# Patient Record
Sex: MALE | Race: WHITE | NOT HISPANIC OR LATINO | Employment: OTHER | URBAN - METROPOLITAN AREA
[De-identification: names, ages, dates, MRNs, and addresses within clinical notes are randomized per-mention and may not be internally consistent; named-entity substitution may affect disease eponyms.]

---

## 2017-01-17 ENCOUNTER — RECORDS - HEALTHEAST (OUTPATIENT)
Dept: LAB | Facility: CLINIC | Age: 67
End: 2017-01-17

## 2017-01-17 LAB — PSA SERPL-MCNC: 1 NG/ML (ref 0–4.5)

## 2017-06-16 ENCOUNTER — AMBULATORY - HEALTHEAST (OUTPATIENT)
Dept: NEUROLOGY | Facility: CLINIC | Age: 67
End: 2017-06-16

## 2017-06-16 DIAGNOSIS — R41.3 MEMORY IMPAIRMENT: ICD-10-CM

## 2017-07-10 ENCOUNTER — OFFICE VISIT (OUTPATIENT)
Dept: NEUROPSYCHOLOGY | Facility: CLINIC | Age: 67
End: 2017-07-10

## 2017-07-10 DIAGNOSIS — R41.842 VISUOSPATIAL DEFICIT: Primary | ICD-10-CM

## 2017-07-10 DIAGNOSIS — F33.2 SEVERE RECURRENT MAJOR DEPRESSION WITHOUT PSYCHOTIC FEATURES (H): ICD-10-CM

## 2017-07-10 DIAGNOSIS — F41.9 ANXIETY: ICD-10-CM

## 2017-07-10 NOTE — PROGRESS NOTES
The patient was seen for neuropsychological evaluation at the request of Willian Marcum for the purpose of diagnostic clarification and treatment planning.  Two hours of face to face testing were provided by this writer.  Please see Dr. Jonathan Langford's report for a full interpretation of the findings.    Nanda Espinoza  Psychometrist

## 2017-07-10 NOTE — MR AVS SNAPSHOT
After Visit Summary   7/10/2017    Leena Glover    MRN: [id number]           Patient Information     Date Of Birth          1950        Visit Information        Provider Department      7/10/2017 8:00 AM Jonathan Langford, PhD [facility] Neuro[facility]ology        Today's Diagnoses     Visuospatial deficit    -  1    Severe recurrent major depression without psychotic features (H)        Anxiety           Follow-ups after your visit        Who to contact     Please call your clinic at [phone] to:    Ask questions about your health    Make or cancel appointments    Discuss your medicines    Learn about your test results    Speak to your doctor   If you have compliments or concerns about an experience at your clinic, or if you wish to file a complaint, please contact [facility] Patient Relations at [phone] or email us at [email or web address]         Additional Information About Your Visit        MyChart Information     [employer] gives you secure access to your electronic health record. If you see a primary care provider, you can also send messages to your care team and make appointments. If you have questions, please call your primary care clinic.  If you do not have a primary care provider, please call [phone] and they will assist you.      [employer] is an electronic gateway that provides easy, online access to your medical records. With [employer], you can request a clinic appointment, read your test results, renew a prescription or communicate with your care team.     To access your existing account, please contact your [facility] or call [phone] for assistance.        Care EveryWhere ID     This is your Care EveryWhere ID. This could be used by other organizations to access your [place] medical records  [id number]         Blood Pressure from Last 3 Encounters:   No data found for BP    Weight from Last 3  Encounters:   No data found for Wt              We Performed the Following     [id number] TESTING, PER HR/PSYCHOLOGIST     NEUROPSYCH TESTING BY [facility]        Primary Care Provider    No Ref-Primary Verified       No address on file        Equal Access to Services     ROSARIO ROSEN : Hadii aad ku hadnilajah Garcia, gabino christianoraizaha, froy aguilar, aydee erikain hayaaangel sawyergus gold maryan braden. So [facility] [phone].    ATENCIÓN: Si habla español, tiene a vázquez disposición servicios gratuitos de asistencia lingüística. Llame al [phone].    We comply with applicable federal civil rights laws and [place] laws. We do not discriminate on the basis of race, color, national origin, age, disability sex, sexual orientation or gender identity.            Thank you!     Thank you for choosing [facility] NEUROPSYCHOLOGY  for your care. Our goal is always to provide you with excellent care. Hearing back from our patients is one way we can continue to improve our services. Please take a few minutes to complete the written survey that you may receive in the mail after your visit with us. Thank you!             Your Updated Medication List - Protect others around you: Learn how to safely use, store and throw away your medicines at www.disposemymeds.org.      Notice  As of 7/10/2017 11:59 PM    You have not been prescribed any medications.

## 2017-07-12 NOTE — PROGRESS NOTES
__ Orientation            Time          __ -1 ____        Place        ____2____        Personal Info.     ____4____    __WAIS-IV   FSIQ____VCI_____PRI_____ WMI__97__PSI___     Raw  Age SS  __Similarities  __22__               ___9___  __Vocabulary  _______ _______  __Information  _______          _______  __Comprehension _______ _______  __Block Design  __20___              __6____  __Matrix Reas.  ________ _______  __Visual Puzzles _______ _______  __Picture Comp. _______ _______  __Figure Weights _______ _______  __Digit Span  __21___ ___8___  __Arithmetic  __15___              __11___  __L-N Sequencing _______ _______  __Symbol Search _______ _______  __Coding  __58___ __11___  __Cancellation  _______ _______    __AVLT  Trial   1         2         3          4        5        B         6            _5_    _6_     _5_     _7_    _6_   _3_     _6_      Raw      %ile  Learning   _29___        -  Short Delay   __6___    20-28  30 min. delayed recall  __5___    28-36  Recognition hits   __11__    16-28  Recognition false positives __9___        -    __Petty/Sue Complex Figure Test    Raw  T-score   %ile  Copy   _22.5__         -                 ?1  Imm. Recall _6.5__  ___32___ __4__  30  Delay _10__  ___39___ __14__  Recognition _22__       ___60___ __84__  Time               _260 __                       __BVRT  (form C)  Copy E-10 rating ____/4     Raw    Zscore  Correct  ___3____ ___-2.34___  Incorrect ___12____ ___-3.12___    __WRAT-4   Reading SS = 92     %ile = 30 GE = 10.8    __COWAT   Raw__41___ Tscore__48___   __Semantic Fluency/Animals   Raw = 22     T-score = 54  __BNT   Raw = 58 %ile = 100  __Complex Ideational Material   Raw = 11/12 T-Score = 41    __Trail Making Test   A =   42         Errors = 0    %ile = 45-54   B =  76        Errors = 0    %ile = 82  __Stroop                       Raw         %ile        Word = _69_       27_        Color =  _36_       <9_        C/W   =  _30_        82_    __JoLO   Raw = 16 %ile = 21    __Grooved Pegboard   RH = 144 T = 26 Drops = 1   LH = 188 T = 23 Drops = 3    __BDI-II  Raw__37__ Interp. __severe___  __BAI  Raw__26__ Interp.__severe___  __MMPI-2RF    __WMS-III   LM1 = 37 SS = 11   LM2 = 20 SS = 11   LM2R = 22 Zscore = -0.80    __TOMM   T1 = 45  T2 = 50
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
## 2017-07-13 ENCOUNTER — OFFICE VISIT (OUTPATIENT)
Dept: NEUROPSYCHOLOGY | Facility: CLINIC | Age: 67
End: 2017-07-13

## 2017-07-13 DIAGNOSIS — R41.842 VISUOSPATIAL DEFICIT: Primary | ICD-10-CM

## 2017-07-13 DIAGNOSIS — F33.2 SEVERE RECURRENT MAJOR DEPRESSION WITHOUT PSYCHOTIC FEATURES (H): ICD-10-CM

## 2017-07-13 DIAGNOSIS — F41.9 ANXIETY: ICD-10-CM

## 2017-07-13 NOTE — MR AVS SNAPSHOT
After Visit Summary   7/13/2017    Leena Glover    MRN: [id number]           Patient Information     Date Of Birth          1950        Visit Information        Provider Department      7/13/2017 9:00 AM Jonathan Langford, PhD 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)        Anxiety           Follow-ups after your visit        Who to contact     Please call your clinic at [phone] to:    Ask questions about your health    Make or cancel appointments    Discuss your medicines    Learn about your test results    Speak to your doctor   If you have compliments or concerns about an experience at your clinic, or if you wish to file a complaint, please contact [facility] Patient Relations at [phone] or email us at [email or web address]         Additional Information About Your Visit        MyChart Information     [employer] interactive [employer] gives you secure access to your electronic health record. If you see a primary care provider, you can also send messages to your care team and make appointments. If you have questions, please call your primary care clinic.  If you do not have a primary care provider, please call [phone] and they will assist you.      [employer] is an electronic gateway that provides easy, online access to your medical records. With [employer], you can request a clinic appointment, read your test results, renew a prescription or communicate with your care team.     To access your existing account, please contact your [facility] or call [phone] for assistance.        Care EveryWhere ID     This is your Care EveryWhere ID. This could be used by other organizations to access your [place] medical records  [id number]         Blood Pressure from Last 3 Encounters:   No data found for BP    Weight from Last 3  Encounters:   No data found for Wt              We Performed the Following     [id number] TESTING, PER HR/PSYCHOLOGIST        Primary Care Provider Office Phone # Fax #    Willian Marcum [phone]       [facility] [address] [address]        Equal Access to Services     ROSARIO ROSEN : Hadii aad ku hadasho Soomaali, waaxda luqadaha, qaybta kaalmada adeegyada, waxay erikain hayluis manueln chelsea steinberg . So [facility] [phone].    ATENCIÓN: Si habla español, tiene a vázquez disposición servicios gratuitos de asistencia lingüística. [facility] [phone].    We comply with applicable federal civil rights laws and [place] laws. We do not discriminate on the basis of race, color, national origin, age, disability sex, sexual orientation or gender identity.            Thank you!     Thank you for choosing [facility] NEUROPSYCHOLOGY  for your care. Our goal is always to provide you with excellent care. Hearing back from our patients is one way we can continue to improve our services. Please take a few minutes to complete the written survey that you may receive in the mail after your visit with us. Thank you!             Your Updated Medication List - Protect others around you: Learn how to safely use, store and throw away your medicines at www.disposemymeds.org.      Notice  As of 7/13/2017 11:26 AM    You have not been prescribed any medications.

## 2017-07-13 NOTE — PROGRESS NOTES
Name: Leena Glover  MR#: [id number]  YOB: 1950  Date of Feedback: 07/13/2017    Neuropsychology Laboratory  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  He was accompanied to the feedback session by his wife, Shannan.    I met with Mr. Glover and his wife for 35 minutes to provide feedback about his neuropsychology exam that was completed on 07/10/2017.  Please see this report for more detailed background information, observations, results and interpretations.      I explained to Mr. Glover that I think that there are essentially two influences to consider when viewing his neuropsychological profile. The first influences that of his past alcohol use. Right hemispheric weaknesses were noted in the exam, and it can be the case that heavy alcohol use over the course of years can produce right hemispheric dysfunction. I was clear with the patient that these weaknesses were mild.    I then went on to explain that mild variability was noted in his cognition. This other variability does not coalesce into a clinically identifiable pattern. I explained to the patient and his wife that I suspect that depression and anxiety are responsible for this variability, and also strongly contribute to his subjective concerns of cognitive dysfunction in day-to-day life. I explained to them the concept of  pseudo-dementia.  I told the patient and his wife that I predict that his day-to-day memory disturbance will be eliminated if he is able to better manage his mental health. I reiterated to him that he does not have a dementing illness.    I discussed my recommendations with the patient his wife. We had an extended discussion about the ways to treat depression and anxiety. He expressed interest in a second opinion with a  psychiatrist. One possible referral option would be Dr. Jer Fine at the [facility] in [place]. Dr. Fine can be reached by calling [phone]. Additionally, I briefly explained to the patient that Dr. Fine has been involved with neuromodulation. While I am not sure whether the patient is a candidate for this therapy, the patient did note that he has tried many medications in the past to treat his depression, and has not really benefited. I also described to the patient that he would greatly benefit from psychotherapy services. Possible referral options would be the Psychology Group at the [facility] ([phone]). Another referral option would be [facility], with locations throughout the [facility]. They can be reached by calling [phone].    I addressed all of the patient s questions and concerns. I encouraged the patient to get in touch with me if he has additional questions.     Jonathan Langford, Ph.D., L.P., ABPP-CN   / Licensed Psychologist [id number]  Department of Rehabilitation Medicine  Division of Adult Neuropsychology  [facility]    Time spent:  one hour professional time, including interpretation and feedback (CPT 61219); Diagnoses: R41.842, F33.2, F41.9.

## 2017-07-20 ENCOUNTER — RECORDS - HEALTHEAST (OUTPATIENT)
Dept: LAB | Facility: CLINIC | Age: 67
End: 2017-07-20

## 2017-07-20 LAB
CHOLEST SERPL-MCNC: 201 MG/DL
FASTING STATUS PATIENT QL REPORTED: ABNORMAL
HDLC SERPL-MCNC: 39 MG/DL
LDLC SERPL CALC-MCNC: 128 MG/DL
TRIGL SERPL-MCNC: 171 MG/DL

## 2017-12-12 ASSESSMENT — MIFFLIN-ST. JEOR: SCORE: 1886.17

## 2017-12-13 ENCOUNTER — ANESTHESIA - HEALTHEAST (OUTPATIENT)
Dept: SURGERY | Facility: CLINIC | Age: 67
End: 2017-12-13

## 2017-12-14 ENCOUNTER — SURGERY - HEALTHEAST (OUTPATIENT)
Dept: SURGERY | Facility: CLINIC | Age: 67
End: 2017-12-14

## 2017-12-14 ENCOUNTER — HOME CARE/HOSPICE - HEALTHEAST (OUTPATIENT)
Dept: HOME HEALTH SERVICES | Facility: HOME HEALTH | Age: 67
End: 2017-12-14

## 2017-12-14 ASSESSMENT — MIFFLIN-ST. JEOR: SCORE: 1847.84

## 2017-12-18 ENCOUNTER — HOME CARE/HOSPICE - HEALTHEAST (OUTPATIENT)
Dept: HOME HEALTH SERVICES | Facility: HOME HEALTH | Age: 67
End: 2017-12-18

## 2018-07-23 ENCOUNTER — RECORDS - HEALTHEAST (OUTPATIENT)
Dept: LAB | Facility: CLINIC | Age: 68
End: 2018-07-23

## 2018-07-23 LAB
CHOLEST SERPL-MCNC: 168 MG/DL
FASTING STATUS PATIENT QL REPORTED: ABNORMAL
FASTING STATUS PATIENT QL REPORTED: NORMAL
GLUCOSE BLD-MCNC: 108 MG/DL (ref 70–125)
HDLC SERPL-MCNC: 40 MG/DL
LDLC SERPL CALC-MCNC: 94 MG/DL
PSA SERPL-MCNC: 0.8 NG/ML (ref 0–4.5)
TRIGL SERPL-MCNC: 169 MG/DL

## 2018-07-24 LAB — HCV AB SERPL QL IA: NEGATIVE

## 2019-06-06 ENCOUNTER — RECORDS - HEALTHEAST (OUTPATIENT)
Dept: ADMINISTRATIVE | Facility: OTHER | Age: 69
End: 2019-06-06

## 2019-07-19 ENCOUNTER — RECORDS - HEALTHEAST (OUTPATIENT)
Dept: LAB | Facility: CLINIC | Age: 69
End: 2019-07-19

## 2019-07-19 LAB
ALBUMIN SERPL-MCNC: 3.8 G/DL (ref 3.5–5)
ALP SERPL-CCNC: 62 U/L (ref 45–120)
ALT SERPL W P-5'-P-CCNC: 14 U/L (ref 0–45)
ANION GAP SERPL CALCULATED.3IONS-SCNC: 9 MMOL/L (ref 5–18)
AST SERPL W P-5'-P-CCNC: 13 U/L (ref 0–40)
BILIRUB SERPL-MCNC: 0.2 MG/DL (ref 0–1)
BUN SERPL-MCNC: 15 MG/DL (ref 8–22)
CALCIUM SERPL-MCNC: 9.2 MG/DL (ref 8.5–10.5)
CHLORIDE BLD-SCNC: 106 MMOL/L (ref 98–107)
CHOLEST SERPL-MCNC: 178 MG/DL
CO2 SERPL-SCNC: 24 MMOL/L (ref 22–31)
CREAT SERPL-MCNC: 0.99 MG/DL (ref 0.7–1.3)
FASTING STATUS PATIENT QL REPORTED: ABNORMAL
GFR SERPL CREATININE-BSD FRML MDRD: >60 ML/MIN/1.73M2
GLUCOSE BLD-MCNC: 97 MG/DL (ref 70–125)
HDLC SERPL-MCNC: 44 MG/DL
LDLC SERPL CALC-MCNC: 99 MG/DL
POTASSIUM BLD-SCNC: 4.3 MMOL/L (ref 3.5–5)
PROT SERPL-MCNC: 6 G/DL (ref 6–8)
PSA SERPL-MCNC: 0.9 NG/ML (ref 0–4.5)
SODIUM SERPL-SCNC: 139 MMOL/L (ref 136–145)
TRIGL SERPL-MCNC: 173 MG/DL

## 2019-07-31 ENCOUNTER — ANESTHESIA - HEALTHEAST (OUTPATIENT)
Dept: SURGERY | Facility: CLINIC | Age: 69
End: 2019-07-31

## 2019-08-01 ENCOUNTER — SURGERY - HEALTHEAST (OUTPATIENT)
Dept: SURGERY | Facility: CLINIC | Age: 69
End: 2019-08-01

## 2019-08-01 ASSESSMENT — MIFFLIN-ST. JEOR: SCORE: 1779.58

## 2019-08-02 ASSESSMENT — MIFFLIN-ST. JEOR: SCORE: 1779.58

## 2019-08-22 ENCOUNTER — RECORDS - HEALTHEAST (OUTPATIENT)
Dept: LAB | Facility: CLINIC | Age: 69
End: 2019-08-22

## 2019-08-22 LAB
C REACTIVE PROTEIN LHE: 27.5 MG/DL (ref 0–0.8)
ERYTHROCYTE [SEDIMENTATION RATE] IN BLOOD BY WESTERGREN METHOD: 36 MM/HR (ref 0–15)

## 2019-08-23 ENCOUNTER — RECORDS - HEALTHEAST (OUTPATIENT)
Dept: LAB | Facility: CLINIC | Age: 69
End: 2019-08-23

## 2019-08-23 ENCOUNTER — ANESTHESIA - HEALTHEAST (OUTPATIENT)
Dept: SURGERY | Facility: CLINIC | Age: 69
End: 2019-08-23

## 2019-08-23 ENCOUNTER — SURGERY - HEALTHEAST (OUTPATIENT)
Dept: SURGERY | Facility: CLINIC | Age: 69
End: 2019-08-23

## 2019-08-23 LAB
APPEARANCE FLD: ABNORMAL
COLOR FLD: ABNORMAL
CRYSTALS SNV MICRO: ABNORMAL
GLUCOSE FLD-MCNC: 7 MG/DL
LYMPHOCYTES NFR FLD MANUAL: 3 %
MONOCYTE % - HISTORICAL: 2 %
NEUTS BAND NFR FLD MANUAL: 96 %
PROT FLD-MCNC: 5.1 G/DL
RBC FLUID - HISTORICAL: ABNORMAL /UL
WBC # FLD AUTO: ABNORMAL /UL (ref 0–99)

## 2019-08-23 ASSESSMENT — MIFFLIN-ST. JEOR: SCORE: 1797.72

## 2019-08-25 ENCOUNTER — HOME CARE/HOSPICE - HEALTHEAST (OUTPATIENT)
Dept: HOME HEALTH SERVICES | Facility: HOME HEALTH | Age: 69
End: 2019-08-25

## 2019-08-26 LAB — BACTERIA SPEC CULT: NORMAL

## 2019-08-29 ENCOUNTER — RECORDS - HEALTHEAST (OUTPATIENT)
Dept: ADMINISTRATIVE | Facility: OTHER | Age: 69
End: 2019-08-29

## 2019-08-29 LAB
BACTERIA SPEC CULT: ABNORMAL
BACTERIA SPEC CULT: ABNORMAL
GRAM STAIN RESULT: ABNORMAL
GRAM STAIN RESULT: ABNORMAL

## 2019-08-30 ENCOUNTER — OFFICE VISIT - HEALTHEAST (OUTPATIENT)
Dept: GERIATRICS | Facility: CLINIC | Age: 69
End: 2019-08-30

## 2019-08-30 ENCOUNTER — RECORDS - HEALTHEAST (OUTPATIENT)
Dept: LAB | Facility: CLINIC | Age: 69
End: 2019-08-30

## 2019-08-30 DIAGNOSIS — Z71.89 ADVANCED CARE PLANNING/COUNSELING DISCUSSION: ICD-10-CM

## 2019-08-30 DIAGNOSIS — T84.50XA PROSTHETIC JOINT INFECTION, INITIAL ENCOUNTER (H): ICD-10-CM

## 2019-08-30 DIAGNOSIS — R53.81 PHYSICAL DECONDITIONING: ICD-10-CM

## 2019-08-30 DIAGNOSIS — Z98.890 S/P KNEE SURGERY: ICD-10-CM

## 2019-09-02 ENCOUNTER — OFFICE VISIT - HEALTHEAST (OUTPATIENT)
Dept: GERIATRICS | Facility: CLINIC | Age: 69
End: 2019-09-02

## 2019-09-02 DIAGNOSIS — G89.18 POST-OP PAIN: ICD-10-CM

## 2019-09-02 DIAGNOSIS — G47.30 SLEEP APNEA, UNSPECIFIED TYPE: ICD-10-CM

## 2019-09-02 DIAGNOSIS — K21.9 GASTROESOPHAGEAL REFLUX DISEASE WITHOUT ESOPHAGITIS: ICD-10-CM

## 2019-09-02 DIAGNOSIS — F32.A DEPRESSION, UNSPECIFIED DEPRESSION TYPE: ICD-10-CM

## 2019-09-02 DIAGNOSIS — T84.50XD INFECTION OF PROSTHETIC JOINT, SUBSEQUENT ENCOUNTER: ICD-10-CM

## 2019-09-02 DIAGNOSIS — J45.20 MILD INTERMITTENT ASTHMA WITHOUT COMPLICATION: ICD-10-CM

## 2019-09-02 DIAGNOSIS — F90.9 ATTENTION DEFICIT HYPERACTIVITY DISORDER (ADHD), UNSPECIFIED ADHD TYPE: ICD-10-CM

## 2019-09-03 LAB
ALBUMIN SERPL-MCNC: 2.5 G/DL (ref 3.5–5)
ALP SERPL-CCNC: 95 U/L (ref 45–120)
ALT SERPL W P-5'-P-CCNC: <9 U/L (ref 0–45)
AST SERPL W P-5'-P-CCNC: 12 U/L (ref 0–40)
BASOPHILS # BLD AUTO: 0 THOU/UL (ref 0–0.2)
BASOPHILS NFR BLD AUTO: 0 % (ref 0–2)
BILIRUB DIRECT SERPL-MCNC: 0.1 MG/DL
BILIRUB SERPL-MCNC: 0.2 MG/DL (ref 0–1)
C REACTIVE PROTEIN LHE: 4.9 MG/DL (ref 0–0.8)
CREAT SERPL-MCNC: 0.83 MG/DL (ref 0.7–1.3)
EOSINOPHIL # BLD AUTO: 0.6 THOU/UL (ref 0–0.4)
EOSINOPHIL NFR BLD AUTO: 6 % (ref 0–6)
ERYTHROCYTE [DISTWIDTH] IN BLOOD BY AUTOMATED COUNT: 13.3 % (ref 11–14.5)
GFR SERPL CREATININE-BSD FRML MDRD: >60 ML/MIN/1.73M2
HCT VFR BLD AUTO: 28.9 % (ref 40–54)
HGB BLD-MCNC: 9.2 G/DL (ref 14–18)
LYMPHOCYTES # BLD AUTO: 1.3 THOU/UL (ref 0.8–4.4)
LYMPHOCYTES NFR BLD AUTO: 13 % (ref 20–40)
MCH RBC QN AUTO: 29.1 PG (ref 27–34)
MCHC RBC AUTO-ENTMCNC: 31.8 G/DL (ref 32–36)
MCV RBC AUTO: 92 FL (ref 80–100)
MONOCYTES # BLD AUTO: 0.8 THOU/UL (ref 0–0.9)
MONOCYTES NFR BLD AUTO: 8 % (ref 2–10)
NEUTROPHILS # BLD AUTO: 7.3 THOU/UL (ref 2–7.7)
NEUTROPHILS NFR BLD AUTO: 72 % (ref 50–70)
PLATELET # BLD AUTO: 372 THOU/UL (ref 140–440)
PMV BLD AUTO: 8.1 FL (ref 8.5–12.5)
PROT SERPL-MCNC: 5.8 G/DL (ref 6–8)
RBC # BLD AUTO: 3.16 MILL/UL (ref 4.4–6.2)
WBC: 10.1 THOU/UL (ref 4–11)

## 2019-09-06 ENCOUNTER — OFFICE VISIT - HEALTHEAST (OUTPATIENT)
Dept: GERIATRICS | Facility: CLINIC | Age: 69
End: 2019-09-06

## 2019-09-06 DIAGNOSIS — Z98.890 S/P KNEE SURGERY: ICD-10-CM

## 2019-09-06 DIAGNOSIS — T84.50XA PROSTHETIC JOINT INFECTION, INITIAL ENCOUNTER (H): ICD-10-CM

## 2019-09-06 DIAGNOSIS — G89.29 CHRONIC PAIN OF RIGHT KNEE: ICD-10-CM

## 2019-09-06 DIAGNOSIS — R53.81 PHYSICAL DECONDITIONING: ICD-10-CM

## 2019-09-06 DIAGNOSIS — M25.561 CHRONIC PAIN OF RIGHT KNEE: ICD-10-CM

## 2019-09-09 ENCOUNTER — OFFICE VISIT - HEALTHEAST (OUTPATIENT)
Dept: GERIATRICS | Facility: CLINIC | Age: 69
End: 2019-09-09

## 2019-09-09 DIAGNOSIS — G89.18 POST-OP PAIN: ICD-10-CM

## 2019-09-09 DIAGNOSIS — G47.30 SLEEP APNEA, UNSPECIFIED TYPE: ICD-10-CM

## 2019-09-09 DIAGNOSIS — F32.A DEPRESSION, UNSPECIFIED DEPRESSION TYPE: ICD-10-CM

## 2019-09-09 DIAGNOSIS — K21.9 GASTROESOPHAGEAL REFLUX DISEASE WITHOUT ESOPHAGITIS: ICD-10-CM

## 2019-09-09 DIAGNOSIS — T84.50XD PROSTHETIC JOINT INFECTION, SUBSEQUENT ENCOUNTER: ICD-10-CM

## 2019-09-09 DIAGNOSIS — F90.9 ATTENTION DEFICIT HYPERACTIVITY DISORDER (ADHD), UNSPECIFIED ADHD TYPE: ICD-10-CM

## 2019-09-10 ENCOUNTER — RECORDS - HEALTHEAST (OUTPATIENT)
Dept: LAB | Facility: CLINIC | Age: 69
End: 2019-09-10

## 2019-09-10 LAB
ALBUMIN SERPL-MCNC: 2.7 G/DL (ref 3.5–5)
ALP SERPL-CCNC: 89 U/L (ref 45–120)
ALT SERPL W P-5'-P-CCNC: <9 U/L (ref 0–45)
AST SERPL W P-5'-P-CCNC: 11 U/L (ref 0–40)
BASOPHILS # BLD AUTO: 0.1 THOU/UL (ref 0–0.2)
BASOPHILS NFR BLD AUTO: 1 % (ref 0–2)
BILIRUB DIRECT SERPL-MCNC: <0.1 MG/DL
BILIRUB SERPL-MCNC: 0.1 MG/DL (ref 0–1)
C REACTIVE PROTEIN LHE: 3.3 MG/DL (ref 0–0.8)
CREAT SERPL-MCNC: 0.85 MG/DL (ref 0.7–1.3)
EOSINOPHIL # BLD AUTO: 0.6 THOU/UL (ref 0–0.4)
EOSINOPHIL NFR BLD AUTO: 8 % (ref 0–6)
ERYTHROCYTE [DISTWIDTH] IN BLOOD BY AUTOMATED COUNT: 13.1 % (ref 11–14.5)
GFR SERPL CREATININE-BSD FRML MDRD: >60 ML/MIN/1.73M2
HCT VFR BLD AUTO: 33.2 % (ref 40–54)
HGB BLD-MCNC: 10.1 G/DL (ref 14–18)
LYMPHOCYTES # BLD AUTO: 1.4 THOU/UL (ref 0.8–4.4)
LYMPHOCYTES NFR BLD AUTO: 19 % (ref 20–40)
MCH RBC QN AUTO: 28.6 PG (ref 27–34)
MCHC RBC AUTO-ENTMCNC: 30.4 G/DL (ref 32–36)
MCV RBC AUTO: 94 FL (ref 80–100)
MONOCYTES # BLD AUTO: 0.8 THOU/UL (ref 0–0.9)
MONOCYTES NFR BLD AUTO: 11 % (ref 2–10)
NEUTROPHILS # BLD AUTO: 4.4 THOU/UL (ref 2–7.7)
NEUTROPHILS NFR BLD AUTO: 61 % (ref 50–70)
PLATELET # BLD AUTO: 416 THOU/UL (ref 140–440)
PMV BLD AUTO: 8.9 FL (ref 8.5–12.5)
PROT SERPL-MCNC: 6.4 G/DL (ref 6–8)
RBC # BLD AUTO: 3.53 MILL/UL (ref 4.4–6.2)
WBC: 7.3 THOU/UL (ref 4–11)

## 2019-09-11 ENCOUNTER — OFFICE VISIT - HEALTHEAST (OUTPATIENT)
Dept: GERIATRICS | Facility: CLINIC | Age: 69
End: 2019-09-11

## 2019-09-11 ENCOUNTER — AMBULATORY - HEALTHEAST (OUTPATIENT)
Dept: GERIATRICS | Facility: CLINIC | Age: 69
End: 2019-09-11

## 2019-09-11 DIAGNOSIS — M25.561 CHRONIC PAIN OF RIGHT KNEE: ICD-10-CM

## 2019-09-11 DIAGNOSIS — G89.29 CHRONIC PAIN OF RIGHT KNEE: ICD-10-CM

## 2019-09-11 DIAGNOSIS — T84.50XA PROSTHETIC JOINT INFECTION, INITIAL ENCOUNTER (H): ICD-10-CM

## 2019-09-11 DIAGNOSIS — R53.81 PHYSICAL DECONDITIONING: ICD-10-CM

## 2019-09-12 ENCOUNTER — OFFICE VISIT - HEALTHEAST (OUTPATIENT)
Dept: GERIATRICS | Facility: CLINIC | Age: 69
End: 2019-09-12

## 2019-09-12 ENCOUNTER — RECORDS - HEALTHEAST (OUTPATIENT)
Dept: ADMINISTRATIVE | Facility: OTHER | Age: 69
End: 2019-09-12

## 2019-09-12 DIAGNOSIS — R53.81 PHYSICAL DECONDITIONING: ICD-10-CM

## 2019-09-12 DIAGNOSIS — T84.50XA PROSTHETIC JOINT INFECTION, INITIAL ENCOUNTER (H): ICD-10-CM

## 2019-09-12 DIAGNOSIS — Z98.890 S/P KNEE SURGERY: ICD-10-CM

## 2019-09-13 ENCOUNTER — RECORDS - HEALTHEAST (OUTPATIENT)
Dept: ADMINISTRATIVE | Facility: OTHER | Age: 69
End: 2019-09-13

## 2019-09-16 ENCOUNTER — AMBULATORY - HEALTHEAST (OUTPATIENT)
Dept: GERIATRICS | Facility: CLINIC | Age: 69
End: 2019-09-16

## 2019-09-17 ENCOUNTER — RECORDS - HEALTHEAST (OUTPATIENT)
Dept: ADMINISTRATIVE | Facility: OTHER | Age: 69
End: 2019-09-17

## 2019-09-24 ENCOUNTER — RECORDS - HEALTHEAST (OUTPATIENT)
Dept: ADMINISTRATIVE | Facility: OTHER | Age: 69
End: 2019-09-24

## 2019-09-30 ENCOUNTER — HEALTH MAINTENANCE LETTER (OUTPATIENT)
Age: 69
End: 2019-09-30

## 2019-09-30 ENCOUNTER — OFFICE VISIT - HEALTHEAST (OUTPATIENT)
Dept: INFECTIOUS DISEASES | Facility: CLINIC | Age: 69
End: 2019-09-30

## 2019-09-30 DIAGNOSIS — T84.50XA PROSTHETIC JOINT INFECTION, INITIAL ENCOUNTER (H): ICD-10-CM

## 2019-09-30 ASSESSMENT — MIFFLIN-ST. JEOR: SCORE: 1738.75

## 2019-10-01 ENCOUNTER — RECORDS - HEALTHEAST (OUTPATIENT)
Dept: ADMINISTRATIVE | Facility: OTHER | Age: 69
End: 2019-10-01

## 2019-10-11 ENCOUNTER — COMMUNICATION - HEALTHEAST (OUTPATIENT)
Dept: INFECTIOUS DISEASES | Facility: CLINIC | Age: 69
End: 2019-10-11

## 2019-10-15 ENCOUNTER — COMMUNICATION - HEALTHEAST (OUTPATIENT)
Dept: INFECTIOUS DISEASES | Facility: CLINIC | Age: 69
End: 2019-10-15

## 2019-10-17 ENCOUNTER — COMMUNICATION - HEALTHEAST (OUTPATIENT)
Dept: INFECTIOUS DISEASES | Facility: CLINIC | Age: 69
End: 2019-10-17

## 2019-10-21 ENCOUNTER — COMMUNICATION - HEALTHEAST (OUTPATIENT)
Dept: INFECTIOUS DISEASES | Facility: CLINIC | Age: 69
End: 2019-10-21

## 2019-10-21 ENCOUNTER — OFFICE VISIT - HEALTHEAST (OUTPATIENT)
Dept: INFECTIOUS DISEASES | Facility: CLINIC | Age: 69
End: 2019-10-21

## 2019-10-21 ENCOUNTER — AMBULATORY - HEALTHEAST (OUTPATIENT)
Dept: LAB | Facility: CLINIC | Age: 69
End: 2019-10-21

## 2019-10-21 DIAGNOSIS — T84.50XA PROSTHETIC JOINT INFECTION, INITIAL ENCOUNTER (H): ICD-10-CM

## 2019-10-21 LAB
ALBUMIN SERPL-MCNC: 3.3 G/DL (ref 3.5–5)
ALP SERPL-CCNC: 79 U/L (ref 45–120)
ALT SERPL W P-5'-P-CCNC: <9 U/L (ref 0–45)
ANION GAP SERPL CALCULATED.3IONS-SCNC: 6 MMOL/L (ref 5–18)
AST SERPL W P-5'-P-CCNC: 8 U/L (ref 0–40)
BASOPHILS # BLD AUTO: 0.1 THOU/UL (ref 0–0.2)
BASOPHILS NFR BLD AUTO: 1 % (ref 0–2)
BILIRUB SERPL-MCNC: 0.2 MG/DL (ref 0–1)
BUN SERPL-MCNC: 11 MG/DL (ref 8–22)
C REACTIVE PROTEIN LHE: 1.5 MG/DL (ref 0–0.8)
CALCIUM SERPL-MCNC: 8.8 MG/DL (ref 8.5–10.5)
CHLORIDE BLD-SCNC: 99 MMOL/L (ref 98–107)
CO2 SERPL-SCNC: 26 MMOL/L (ref 22–31)
CREAT SERPL-MCNC: 0.85 MG/DL (ref 0.7–1.3)
EOSINOPHIL # BLD AUTO: 1.4 THOU/UL (ref 0–0.4)
EOSINOPHIL NFR BLD AUTO: 12 % (ref 0–6)
ERYTHROCYTE [DISTWIDTH] IN BLOOD BY AUTOMATED COUNT: 12.8 % (ref 11–14.5)
GFR SERPL CREATININE-BSD FRML MDRD: >60 ML/MIN/1.73M2
GLUCOSE BLD-MCNC: 173 MG/DL (ref 70–125)
HCT VFR BLD AUTO: 32.8 % (ref 40–54)
HGB BLD-MCNC: 11.3 G/DL (ref 14–18)
LYMPHOCYTES # BLD AUTO: 1.6 THOU/UL (ref 0.8–4.4)
LYMPHOCYTES NFR BLD AUTO: 13 % (ref 20–40)
MCH RBC QN AUTO: 30.3 PG (ref 27–34)
MCHC RBC AUTO-ENTMCNC: 34.6 G/DL (ref 32–36)
MCV RBC AUTO: 87 FL (ref 80–100)
MONOCYTES # BLD AUTO: 0.8 THOU/UL (ref 0–0.9)
MONOCYTES NFR BLD AUTO: 6 % (ref 2–10)
NEUTROPHILS # BLD AUTO: 8.5 THOU/UL (ref 2–7.7)
NEUTROPHILS NFR BLD AUTO: 69 % (ref 50–70)
PLATELET # BLD AUTO: 361 THOU/UL (ref 140–440)
PMV BLD AUTO: 5.8 FL (ref 7–10)
POTASSIUM BLD-SCNC: 3.7 MMOL/L (ref 3.5–5)
PROT SERPL-MCNC: 6.1 G/DL (ref 6–8)
RBC # BLD AUTO: 3.75 MILL/UL (ref 4.4–6.2)
SODIUM SERPL-SCNC: 131 MMOL/L (ref 136–145)
WBC: 12.2 THOU/UL (ref 4–11)

## 2019-10-22 ENCOUNTER — AMBULATORY - HEALTHEAST (OUTPATIENT)
Dept: INFECTIOUS DISEASES | Facility: CLINIC | Age: 69
End: 2019-10-22

## 2019-10-22 DIAGNOSIS — T84.50XA PROSTHETIC JOINT INFECTION, INITIAL ENCOUNTER (H): ICD-10-CM

## 2019-10-27 ENCOUNTER — COMMUNICATION - HEALTHEAST (OUTPATIENT)
Dept: INFECTIOUS DISEASES | Facility: CLINIC | Age: 69
End: 2019-10-27

## 2019-10-29 ENCOUNTER — COMMUNICATION - HEALTHEAST (OUTPATIENT)
Dept: INFECTIOUS DISEASES | Facility: CLINIC | Age: 69
End: 2019-10-29

## 2019-10-29 ENCOUNTER — AMBULATORY - HEALTHEAST (OUTPATIENT)
Dept: LAB | Facility: CLINIC | Age: 69
End: 2019-10-29

## 2019-10-29 DIAGNOSIS — T84.50XA PROSTHETIC JOINT INFECTION, INITIAL ENCOUNTER (H): ICD-10-CM

## 2019-10-29 LAB
ANION GAP SERPL CALCULATED.3IONS-SCNC: 11 MMOL/L (ref 5–18)
BASOPHILS # BLD AUTO: 0.1 THOU/UL (ref 0–0.2)
BASOPHILS NFR BLD AUTO: 1 % (ref 0–2)
BUN SERPL-MCNC: 13 MG/DL (ref 8–22)
CALCIUM SERPL-MCNC: 9.3 MG/DL (ref 8.5–10.5)
CHLORIDE BLD-SCNC: 101 MMOL/L (ref 98–107)
CO2 SERPL-SCNC: 24 MMOL/L (ref 22–31)
CREAT SERPL-MCNC: 0.88 MG/DL (ref 0.7–1.3)
EOSINOPHIL # BLD AUTO: 1 THOU/UL (ref 0–0.4)
EOSINOPHIL NFR BLD AUTO: 9 % (ref 0–6)
ERYTHROCYTE [DISTWIDTH] IN BLOOD BY AUTOMATED COUNT: 12.6 % (ref 11–14.5)
GFR SERPL CREATININE-BSD FRML MDRD: >60 ML/MIN/1.73M2
GLUCOSE BLD-MCNC: 111 MG/DL (ref 70–125)
HCT VFR BLD AUTO: 35.1 % (ref 40–54)
HGB BLD-MCNC: 11.9 G/DL (ref 14–18)
LYMPHOCYTES # BLD AUTO: 1.7 THOU/UL (ref 0.8–4.4)
LYMPHOCYTES NFR BLD AUTO: 15 % (ref 20–40)
MCH RBC QN AUTO: 29.4 PG (ref 27–34)
MCHC RBC AUTO-ENTMCNC: 34 G/DL (ref 32–36)
MCV RBC AUTO: 86 FL (ref 80–100)
MONOCYTES # BLD AUTO: 0.8 THOU/UL (ref 0–0.9)
MONOCYTES NFR BLD AUTO: 7 % (ref 2–10)
NEUTROPHILS # BLD AUTO: 7.5 THOU/UL (ref 2–7.7)
NEUTROPHILS NFR BLD AUTO: 68 % (ref 50–70)
PLATELET # BLD AUTO: 358 THOU/UL (ref 140–440)
PMV BLD AUTO: 5.9 FL (ref 7–10)
POTASSIUM BLD-SCNC: 4.7 MMOL/L (ref 3.5–5)
RBC # BLD AUTO: 4.06 MILL/UL (ref 4.4–6.2)
SODIUM SERPL-SCNC: 136 MMOL/L (ref 136–145)
WBC: 11.1 THOU/UL (ref 4–11)

## 2019-11-04 ENCOUNTER — COMMUNICATION - HEALTHEAST (OUTPATIENT)
Dept: INFECTIOUS DISEASES | Facility: CLINIC | Age: 69
End: 2019-11-04

## 2019-12-09 ENCOUNTER — AMBULATORY - HEALTHEAST (OUTPATIENT)
Dept: LAB | Facility: CLINIC | Age: 69
End: 2019-12-09

## 2019-12-09 ENCOUNTER — OFFICE VISIT - HEALTHEAST (OUTPATIENT)
Dept: INFECTIOUS DISEASES | Facility: CLINIC | Age: 69
End: 2019-12-09

## 2019-12-09 DIAGNOSIS — T84.50XA PROSTHETIC JOINT INFECTION, INITIAL ENCOUNTER (H): ICD-10-CM

## 2019-12-09 LAB
ALBUMIN SERPL-MCNC: 3.4 G/DL (ref 3.5–5)
ALP SERPL-CCNC: 59 U/L (ref 45–120)
ALT SERPL W P-5'-P-CCNC: <9 U/L (ref 0–45)
ANION GAP SERPL CALCULATED.3IONS-SCNC: 8 MMOL/L (ref 5–18)
AST SERPL W P-5'-P-CCNC: 10 U/L (ref 0–40)
BASOPHILS # BLD AUTO: 0 THOU/UL (ref 0–0.2)
BASOPHILS NFR BLD AUTO: 1 % (ref 0–2)
BILIRUB SERPL-MCNC: 0.2 MG/DL (ref 0–1)
BUN SERPL-MCNC: 15 MG/DL (ref 8–22)
C REACTIVE PROTEIN LHE: 0.1 MG/DL (ref 0–0.8)
CALCIUM SERPL-MCNC: 8.8 MG/DL (ref 8.5–10.5)
CHLORIDE BLD-SCNC: 108 MMOL/L (ref 98–107)
CO2 SERPL-SCNC: 24 MMOL/L (ref 22–31)
CREAT SERPL-MCNC: 0.91 MG/DL (ref 0.7–1.3)
EOSINOPHIL # BLD AUTO: 0.7 THOU/UL (ref 0–0.4)
EOSINOPHIL NFR BLD AUTO: 9 % (ref 0–6)
ERYTHROCYTE [DISTWIDTH] IN BLOOD BY AUTOMATED COUNT: 12.5 % (ref 11–14.5)
GFR SERPL CREATININE-BSD FRML MDRD: >60 ML/MIN/1.73M2
GLUCOSE BLD-MCNC: 60 MG/DL (ref 70–125)
HCT VFR BLD AUTO: 33.8 % (ref 40–54)
HGB BLD-MCNC: 11.4 G/DL (ref 14–18)
LYMPHOCYTES # BLD AUTO: 1.6 THOU/UL (ref 0.8–4.4)
LYMPHOCYTES NFR BLD AUTO: 23 % (ref 20–40)
MCH RBC QN AUTO: 28.6 PG (ref 27–34)
MCHC RBC AUTO-ENTMCNC: 33.5 G/DL (ref 32–36)
MCV RBC AUTO: 85 FL (ref 80–100)
MONOCYTES # BLD AUTO: 0.7 THOU/UL (ref 0–0.9)
MONOCYTES NFR BLD AUTO: 10 % (ref 2–10)
NEUTROPHILS # BLD AUTO: 4.1 THOU/UL (ref 2–7.7)
NEUTROPHILS NFR BLD AUTO: 58 % (ref 50–70)
PLATELET # BLD AUTO: 238 THOU/UL (ref 140–440)
PMV BLD AUTO: 6.4 FL (ref 7–10)
POTASSIUM BLD-SCNC: 4.3 MMOL/L (ref 3.5–5)
PROT SERPL-MCNC: 6.3 G/DL (ref 6–8)
RBC # BLD AUTO: 3.96 MILL/UL (ref 4.4–6.2)
SODIUM SERPL-SCNC: 140 MMOL/L (ref 136–145)
WBC: 7.2 THOU/UL (ref 4–11)

## 2020-01-20 ENCOUNTER — AMBULATORY - HEALTHEAST (OUTPATIENT)
Dept: LAB | Facility: CLINIC | Age: 70
End: 2020-01-20

## 2020-01-20 ENCOUNTER — OFFICE VISIT - HEALTHEAST (OUTPATIENT)
Dept: INFECTIOUS DISEASES | Facility: CLINIC | Age: 70
End: 2020-01-20

## 2020-01-20 DIAGNOSIS — T84.50XA PROSTHETIC JOINT INFECTION, INITIAL ENCOUNTER (H): ICD-10-CM

## 2020-01-20 LAB
ALBUMIN SERPL-MCNC: 3.3 G/DL (ref 3.5–5)
ALP SERPL-CCNC: 55 U/L (ref 45–120)
ALT SERPL W P-5'-P-CCNC: <9 U/L (ref 0–45)
ANION GAP SERPL CALCULATED.3IONS-SCNC: 8 MMOL/L (ref 5–18)
AST SERPL W P-5'-P-CCNC: 11 U/L (ref 0–40)
BASOPHILS # BLD AUTO: 0 THOU/UL (ref 0–0.2)
BASOPHILS NFR BLD AUTO: 0 % (ref 0–2)
BILIRUB SERPL-MCNC: 0.3 MG/DL (ref 0–1)
BUN SERPL-MCNC: 17 MG/DL (ref 8–28)
C REACTIVE PROTEIN LHE: 0.1 MG/DL (ref 0–0.8)
CALCIUM SERPL-MCNC: 8.7 MG/DL (ref 8.5–10.5)
CHLORIDE BLD-SCNC: 107 MMOL/L (ref 98–107)
CO2 SERPL-SCNC: 24 MMOL/L (ref 22–31)
CREAT SERPL-MCNC: 1 MG/DL (ref 0.7–1.3)
EOSINOPHIL COUNT (ABSOLUTE): 0.9 THOU/UL (ref 0–0.4)
EOSINOPHIL NFR BLD AUTO: 14 % (ref 0–6)
ERYTHROCYTE [DISTWIDTH] IN BLOOD BY AUTOMATED COUNT: 13.3 % (ref 11–14.5)
GFR SERPL CREATININE-BSD FRML MDRD: >60 ML/MIN/1.73M2
GLUCOSE BLD-MCNC: 137 MG/DL (ref 70–125)
HCT VFR BLD AUTO: 34.9 % (ref 40–54)
HGB BLD-MCNC: 11.2 G/DL (ref 14–18)
LYMPHOCYTES # BLD AUTO: 1.3 THOU/UL (ref 0.8–4.4)
LYMPHOCYTES NFR BLD AUTO: 19 % (ref 20–40)
MCH RBC QN AUTO: 27.9 PG (ref 27–34)
MCHC RBC AUTO-ENTMCNC: 32.1 G/DL (ref 32–36)
MCV RBC AUTO: 87 FL (ref 80–100)
MONOCYTES # BLD AUTO: 0.5 THOU/UL (ref 0–0.9)
MONOCYTES NFR BLD AUTO: 7 % (ref 2–10)
PLAT MORPH BLD: NORMAL
PLATELET # BLD AUTO: 175 THOU/UL (ref 140–440)
PMV BLD AUTO: 9.7 FL (ref 8.5–12.5)
POTASSIUM BLD-SCNC: 4 MMOL/L (ref 3.5–5)
PROT SERPL-MCNC: 5.8 G/DL (ref 6–8)
RBC # BLD AUTO: 4.01 MILL/UL (ref 4.4–6.2)
SODIUM SERPL-SCNC: 139 MMOL/L (ref 136–145)
TOTAL NEUTROPHILS-ABS(DIFF): 4 THOU/UL (ref 2–7.7)
TOTAL NEUTROPHILS-REL(DIFF): 60 % (ref 50–70)
WBC: 6.6 THOU/UL (ref 4–11)

## 2020-03-02 ENCOUNTER — AMBULATORY - HEALTHEAST (OUTPATIENT)
Dept: LAB | Facility: CLINIC | Age: 70
End: 2020-03-02

## 2020-03-02 ENCOUNTER — OFFICE VISIT - HEALTHEAST (OUTPATIENT)
Dept: INFECTIOUS DISEASES | Facility: CLINIC | Age: 70
End: 2020-03-02

## 2020-03-02 DIAGNOSIS — T84.50XA PROSTHETIC JOINT INFECTION, INITIAL ENCOUNTER (H): ICD-10-CM

## 2020-03-02 LAB
ALBUMIN SERPL-MCNC: 3.5 G/DL (ref 3.5–5)
ALP SERPL-CCNC: 62 U/L (ref 45–120)
ALT SERPL W P-5'-P-CCNC: 11 U/L (ref 0–45)
ANION GAP SERPL CALCULATED.3IONS-SCNC: 11 MMOL/L (ref 5–18)
AST SERPL W P-5'-P-CCNC: 12 U/L (ref 0–40)
BASOPHILS # BLD AUTO: 0 THOU/UL (ref 0–0.2)
BASOPHILS NFR BLD AUTO: 1 % (ref 0–2)
BILIRUB SERPL-MCNC: 0.2 MG/DL (ref 0–1)
BUN SERPL-MCNC: 14 MG/DL (ref 8–28)
CALCIUM SERPL-MCNC: 8.7 MG/DL (ref 8.5–10.5)
CHLORIDE BLD-SCNC: 105 MMOL/L (ref 98–107)
CO2 SERPL-SCNC: 21 MMOL/L (ref 22–31)
CREAT SERPL-MCNC: 1 MG/DL (ref 0.7–1.3)
EOSINOPHIL # BLD AUTO: 0.9 THOU/UL (ref 0–0.4)
EOSINOPHIL NFR BLD AUTO: 15 % (ref 0–6)
ERYTHROCYTE [DISTWIDTH] IN BLOOD BY AUTOMATED COUNT: 13.7 % (ref 11–14.5)
GFR SERPL CREATININE-BSD FRML MDRD: >60 ML/MIN/1.73M2
GLUCOSE BLD-MCNC: 104 MG/DL (ref 70–125)
HCT VFR BLD AUTO: 36 % (ref 40–54)
HGB BLD-MCNC: 12.3 G/DL (ref 14–18)
LYMPHOCYTES # BLD AUTO: 1.4 THOU/UL (ref 0.8–4.4)
LYMPHOCYTES NFR BLD AUTO: 23 % (ref 20–40)
MCH RBC QN AUTO: 29.1 PG (ref 27–34)
MCHC RBC AUTO-ENTMCNC: 34.1 G/DL (ref 32–36)
MCV RBC AUTO: 85 FL (ref 80–100)
MONOCYTES # BLD AUTO: 0.5 THOU/UL (ref 0–0.9)
MONOCYTES NFR BLD AUTO: 9 % (ref 2–10)
NEUTROPHILS # BLD AUTO: 3.2 THOU/UL (ref 2–7.7)
NEUTROPHILS NFR BLD AUTO: 53 % (ref 50–70)
PLATELET # BLD AUTO: 192 THOU/UL (ref 140–440)
PMV BLD AUTO: 7.3 FL (ref 7–10)
POTASSIUM BLD-SCNC: 4.4 MMOL/L (ref 3.5–5)
PROT SERPL-MCNC: 6.1 G/DL (ref 6–8)
RBC # BLD AUTO: 4.23 MILL/UL (ref 4.4–6.2)
SODIUM SERPL-SCNC: 137 MMOL/L (ref 136–145)
WBC: 6.1 THOU/UL (ref 4–11)

## 2020-03-03 LAB — C REACTIVE PROTEIN LHE: <0.1 MG/DL (ref 0–0.8)

## 2020-03-15 ENCOUNTER — HEALTH MAINTENANCE LETTER (OUTPATIENT)
Age: 70
End: 2020-03-15

## 2020-03-23 ENCOUNTER — COMMUNICATION - HEALTHEAST (OUTPATIENT)
Dept: INFECTIOUS DISEASES | Facility: CLINIC | Age: 70
End: 2020-03-23

## 2020-03-23 DIAGNOSIS — T84.50XA PROSTHETIC JOINT INFECTION, INITIAL ENCOUNTER (H): ICD-10-CM

## 2020-04-27 ENCOUNTER — OFFICE VISIT - HEALTHEAST (OUTPATIENT)
Dept: INFECTIOUS DISEASES | Facility: CLINIC | Age: 70
End: 2020-04-27

## 2020-04-27 DIAGNOSIS — T84.50XA PROSTHETIC JOINT INFECTION, INITIAL ENCOUNTER (H): ICD-10-CM

## 2020-04-27 RX ORDER — BUSPIRONE HYDROCHLORIDE 15 MG/1
15 TABLET ORAL DAILY
Status: SHIPPED | COMMUNITY
Start: 2020-04-11

## 2020-04-30 ENCOUNTER — AMBULATORY - HEALTHEAST (OUTPATIENT)
Dept: LAB | Facility: CLINIC | Age: 70
End: 2020-04-30

## 2020-04-30 DIAGNOSIS — T84.50XA PROSTHETIC JOINT INFECTION, INITIAL ENCOUNTER (H): ICD-10-CM

## 2020-04-30 LAB
ALBUMIN SERPL-MCNC: 3.5 G/DL (ref 3.5–5)
ALP SERPL-CCNC: 55 U/L (ref 45–120)
ALT SERPL W P-5'-P-CCNC: 15 U/L (ref 0–45)
ANION GAP SERPL CALCULATED.3IONS-SCNC: 8 MMOL/L (ref 5–18)
AST SERPL W P-5'-P-CCNC: 14 U/L (ref 0–40)
BASOPHILS # BLD AUTO: 0 THOU/UL (ref 0–0.2)
BASOPHILS NFR BLD AUTO: 0 % (ref 0–2)
BILIRUB SERPL-MCNC: 0.2 MG/DL (ref 0–1)
BUN SERPL-MCNC: 12 MG/DL (ref 8–28)
C REACTIVE PROTEIN LHE: <0.1 MG/DL (ref 0–0.8)
CALCIUM SERPL-MCNC: 8.3 MG/DL (ref 8.5–10.5)
CHLORIDE BLD-SCNC: 105 MMOL/L (ref 98–107)
CO2 SERPL-SCNC: 25 MMOL/L (ref 22–31)
CREAT SERPL-MCNC: 1.06 MG/DL (ref 0.7–1.3)
EOSINOPHIL COUNT (ABSOLUTE): 1.1 THOU/UL (ref 0–0.4)
EOSINOPHIL NFR BLD AUTO: 16 % (ref 0–6)
ERYTHROCYTE [DISTWIDTH] IN BLOOD BY AUTOMATED COUNT: 14.3 % (ref 11–14.5)
GFR SERPL CREATININE-BSD FRML MDRD: >60 ML/MIN/1.73M2
GLUCOSE BLD-MCNC: 100 MG/DL (ref 70–125)
HCT VFR BLD AUTO: 35.3 % (ref 40–54)
HGB BLD-MCNC: 11.1 G/DL (ref 14–18)
LYMPHOCYTES # BLD AUTO: 1.3 THOU/UL (ref 0.8–4.4)
LYMPHOCYTES NFR BLD AUTO: 20 % (ref 20–40)
MCH RBC QN AUTO: 28.7 PG (ref 27–34)
MCHC RBC AUTO-ENTMCNC: 31.4 G/DL (ref 32–36)
MCV RBC AUTO: 91 FL (ref 80–100)
MONOCYTES # BLD AUTO: 0.6 THOU/UL (ref 0–0.9)
MONOCYTES NFR BLD AUTO: 9 % (ref 2–10)
OVALOCYTES: ABNORMAL
PLAT MORPH BLD: NORMAL
PLATELET # BLD AUTO: 177 THOU/UL (ref 140–440)
PMV BLD AUTO: 10.2 FL (ref 8.5–12.5)
POTASSIUM BLD-SCNC: 4.5 MMOL/L (ref 3.5–5)
PROT SERPL-MCNC: 5.8 G/DL (ref 6–8)
RBC # BLD AUTO: 3.87 MILL/UL (ref 4.4–6.2)
SODIUM SERPL-SCNC: 138 MMOL/L (ref 136–145)
TOTAL NEUTROPHILS-ABS(DIFF): 3.6 THOU/UL (ref 2–7.7)
TOTAL NEUTROPHILS-REL(DIFF): 55 % (ref 50–70)
WBC: 6.6 THOU/UL (ref 4–11)

## 2020-06-29 ENCOUNTER — OFFICE VISIT - HEALTHEAST (OUTPATIENT)
Dept: INFECTIOUS DISEASES | Facility: CLINIC | Age: 70
End: 2020-06-29

## 2020-06-29 DIAGNOSIS — T84.50XA PROSTHETIC JOINT INFECTION, INITIAL ENCOUNTER (H): ICD-10-CM

## 2020-06-30 ENCOUNTER — AMBULATORY - HEALTHEAST (OUTPATIENT)
Dept: LAB | Facility: CLINIC | Age: 70
End: 2020-06-30

## 2020-06-30 DIAGNOSIS — T84.50XA PROSTHETIC JOINT INFECTION, INITIAL ENCOUNTER (H): ICD-10-CM

## 2020-06-30 LAB
ANION GAP SERPL CALCULATED.3IONS-SCNC: 9 MMOL/L (ref 5–18)
BASOPHILS # BLD AUTO: 0 THOU/UL (ref 0–0.2)
BASOPHILS NFR BLD AUTO: 1 % (ref 0–2)
BUN SERPL-MCNC: 16 MG/DL (ref 8–28)
C REACTIVE PROTEIN LHE: <0.1 MG/DL (ref 0–0.8)
CALCIUM SERPL-MCNC: 8.5 MG/DL (ref 8.5–10.5)
CHLORIDE BLD-SCNC: 106 MMOL/L (ref 98–107)
CO2 SERPL-SCNC: 22 MMOL/L (ref 22–31)
CREAT SERPL-MCNC: 1.11 MG/DL (ref 0.7–1.3)
EOSINOPHIL # BLD AUTO: 0.9 THOU/UL (ref 0–0.4)
EOSINOPHIL NFR BLD AUTO: 13 % (ref 0–6)
ERYTHROCYTE [DISTWIDTH] IN BLOOD BY AUTOMATED COUNT: 13 % (ref 11–14.5)
GFR SERPL CREATININE-BSD FRML MDRD: >60 ML/MIN/1.73M2
GLUCOSE BLD-MCNC: 101 MG/DL (ref 70–125)
HCT VFR BLD AUTO: 32.9 % (ref 40–54)
HGB BLD-MCNC: 11.3 G/DL (ref 14–18)
LYMPHOCYTES # BLD AUTO: 1.5 THOU/UL (ref 0.8–4.4)
LYMPHOCYTES NFR BLD AUTO: 22 % (ref 20–40)
MCH RBC QN AUTO: 29.1 PG (ref 27–34)
MCHC RBC AUTO-ENTMCNC: 34.5 G/DL (ref 32–36)
MCV RBC AUTO: 85 FL (ref 80–100)
MONOCYTES # BLD AUTO: 0.6 THOU/UL (ref 0–0.9)
MONOCYTES NFR BLD AUTO: 9 % (ref 2–10)
NEUTROPHILS # BLD AUTO: 3.7 THOU/UL (ref 2–7.7)
NEUTROPHILS NFR BLD AUTO: 56 % (ref 50–70)
PLATELET # BLD AUTO: 186 THOU/UL (ref 140–440)
PMV BLD AUTO: 6.7 FL (ref 7–10)
POTASSIUM BLD-SCNC: 4.6 MMOL/L (ref 3.5–5)
RBC # BLD AUTO: 3.89 MILL/UL (ref 4.4–6.2)
SODIUM SERPL-SCNC: 137 MMOL/L (ref 136–145)
WBC: 6.7 THOU/UL (ref 4–11)

## 2020-08-08 ENCOUNTER — COMMUNICATION - HEALTHEAST (OUTPATIENT)
Dept: INFECTIOUS DISEASES | Facility: CLINIC | Age: 70
End: 2020-08-08

## 2020-08-08 DIAGNOSIS — T84.50XA PROSTHETIC JOINT INFECTION, INITIAL ENCOUNTER (H): ICD-10-CM

## 2020-08-10 ENCOUNTER — OFFICE VISIT - HEALTHEAST (OUTPATIENT)
Dept: INFECTIOUS DISEASES | Facility: CLINIC | Age: 70
End: 2020-08-10

## 2020-08-10 DIAGNOSIS — T84.50XA PROSTHETIC JOINT INFECTION, INITIAL ENCOUNTER (H): ICD-10-CM

## 2021-01-15 ENCOUNTER — HEALTH MAINTENANCE LETTER (OUTPATIENT)
Age: 71
End: 2021-01-15

## 2021-02-10 ENCOUNTER — RECORDS - HEALTHEAST (OUTPATIENT)
Dept: LAB | Facility: CLINIC | Age: 71
End: 2021-02-10

## 2021-02-10 LAB
C REACTIVE PROTEIN LHE: <0.1 MG/DL (ref 0–0.8)
ERYTHROCYTE [DISTWIDTH] IN BLOOD BY AUTOMATED COUNT: 13.4 % (ref 11–14.5)
ERYTHROCYTE [SEDIMENTATION RATE] IN BLOOD BY WESTERGREN METHOD: 4 MM/HR (ref 0–15)
HCT VFR BLD AUTO: 39.6 % (ref 40–54)
HGB BLD-MCNC: 12.9 G/DL (ref 14–18)
MCH RBC QN AUTO: 29.1 PG (ref 27–34)
MCHC RBC AUTO-ENTMCNC: 32.6 G/DL (ref 32–36)
MCV RBC AUTO: 89 FL (ref 80–100)
PLATELET # BLD AUTO: 180 THOU/UL (ref 140–440)
PMV BLD AUTO: 8.6 FL (ref 8.5–12.5)
RBC # BLD AUTO: 4.43 MILL/UL (ref 4.4–6.2)
WBC: 7.3 THOU/UL (ref 4–11)

## 2021-03-07 ENCOUNTER — AMBULATORY - HEALTHEAST (OUTPATIENT)
Dept: SURGERY | Facility: CLINIC | Age: 71
End: 2021-03-07

## 2021-03-07 DIAGNOSIS — Z11.59 ENCOUNTER FOR SCREENING FOR OTHER VIRAL DISEASES: ICD-10-CM

## 2021-03-08 ASSESSMENT — MIFFLIN-ST. JEOR: SCORE: 1806.79

## 2021-04-03 ENCOUNTER — RECORDS - HEALTHEAST (OUTPATIENT)
Dept: LAB | Facility: CLINIC | Age: 71
End: 2021-04-03

## 2021-04-03 LAB
SARS-COV-2 PCR COMMENT: NORMAL
SARS-COV-2 RNA SPEC QL NAA+PROBE: NEGATIVE
SARS-COV-2 VIRUS SPECIMEN SOURCE: NORMAL

## 2021-04-06 ENCOUNTER — ANESTHESIA - HEALTHEAST (OUTPATIENT)
Dept: SURGERY | Facility: CLINIC | Age: 71
End: 2021-04-06

## 2021-04-06 ENCOUNTER — SURGERY - HEALTHEAST (OUTPATIENT)
Dept: SURGERY | Facility: CLINIC | Age: 71
End: 2021-04-06

## 2021-04-06 ASSESSMENT — MIFFLIN-ST. JEOR: SCORE: 1791.82

## 2021-05-09 ENCOUNTER — HEALTH MAINTENANCE LETTER (OUTPATIENT)
Age: 71
End: 2021-05-09

## 2021-05-25 ENCOUNTER — RECORDS - HEALTHEAST (OUTPATIENT)
Dept: ADMINISTRATIVE | Facility: CLINIC | Age: 71
End: 2021-05-25

## 2021-05-26 VITALS
DIASTOLIC BLOOD PRESSURE: 63 MMHG | OXYGEN SATURATION: 98 % | RESPIRATION RATE: 16 BRPM | HEART RATE: 88 BPM | SYSTOLIC BLOOD PRESSURE: 130 MMHG | TEMPERATURE: 96.9 F

## 2021-05-26 VITALS
DIASTOLIC BLOOD PRESSURE: 64 MMHG | RESPIRATION RATE: 16 BRPM | SYSTOLIC BLOOD PRESSURE: 126 MMHG | HEART RATE: 72 BPM | TEMPERATURE: 96.8 F | OXYGEN SATURATION: 95 %

## 2021-05-29 ENCOUNTER — RECORDS - HEALTHEAST (OUTPATIENT)
Dept: ADMINISTRATIVE | Facility: CLINIC | Age: 71
End: 2021-05-29

## 2021-05-31 VITALS — BODY MASS INDEX: 27.83 KG/M2 | HEIGHT: 75 IN | WEIGHT: 223.8 LBS

## 2021-05-31 NOTE — ANESTHESIA POSTPROCEDURE EVALUATION
Patient: Leena Glover  INCISION AND DRAINAGE/DEBRIDMENT OF RIGHT TOTAL KNEE REPLACMENT, EXCHANGE POLY - poly exchange  Anesthesia type: general    Patient location: PACU  Last vitals:   Vitals Value Taken Time   /62 8/24/2019  1:35 AM   Temp 36.2  C (97.2  F) 8/24/2019  1:35 AM   Pulse 90 8/24/2019  1:35 AM   Resp 16 8/24/2019  1:35 AM   SpO2 95 % 8/24/2019  1:35 AM     Post vital signs: stable  Level of consciousness: awake and responds to simple questions  Post-anesthesia pain: pain controlled  Post-anesthesia nausea and vomiting: no  Pulmonary: unassisted, return to baseline  Cardiovascular: stable and blood pressure at baseline  Hydration: adequate  Anesthetic events: no    QCDR Measures:  ASA# 11 - Coretta-op Cardiac Arrest: ASA11B - Patient did NOT experience unanticipated cardiac arrest  ASA# 12 - Coretta-op Mortality Rate: ASA12B - Patient did NOT die  ASA# 13 - PACU Re-Intubation Rate: ASA13B - Patient did NOT require a new airway mgmt  ASA# 10 - Composite Anes Safety: ASA10A - No serious adverse event    Additional Notes:

## 2021-05-31 NOTE — ANESTHESIA CARE TRANSFER NOTE
Last vitals:   Vitals:    08/23/19 2331   BP: 137/62   Pulse: 94   Resp: 14   Temp: 36.8  C (98.2  F)   SpO2: 100%     Patient's level of consciousness is awake  Spontaneous respirations: yes  Maintains airway independently: yes  Dentition unchanged: yes  Oropharynx: oropharynx clear of all foreign objects    QCDR Measures:  ASA# 20 - Surgical Safety Checklist: WHO surgical safety checklist completed prior to induction    PQRS# 430 - Adult PONV Prevention: 4558F-1P - Medical reason for NOT administering combination therapy  ASA# 8 - Peds PONV Prevention: 4558F-1P - Medical reason for NOT administering combination therapy  PQRS# 424 - Coretta-op Temp Management: 4559F - At least one body temp DOCUMENTED => 35.5C or 95.9F within required timeframe  PQRS# 426 - PACU Transfer Protocol: - Transfer of care checklist used  ASA# 14 - Acute Post-op Pain: ASA14B - Patient did NOT experience pain >= 7 out of 10

## 2021-05-31 NOTE — ANESTHESIA PREPROCEDURE EVALUATION
Anesthesia Evaluation      Patient summary reviewed   No history of anesthetic complications     Airway   Mallampati: II  Neck ROM: full   Pulmonary - normal exam                          Cardiovascular - negative ROS and normal exam   Neuro/Psych - negative ROS     Endo/Other - negative ROS      GI/Hepatic/Renal - negative ROS           Dental                         Anesthesia Plan  Planned anesthetic: spinal and peripheral nerve block    ASA 3     Anesthetic plan and risks discussed with: patient

## 2021-05-31 NOTE — ANESTHESIA PROCEDURE NOTES
Spinal Block    Patient location during procedure: OR  Start time: 8/1/2019 7:55 AM  End time: 8/1/2019 8:00 AM  Reason for block: primary anesthetic    Staffing:  Performing  Anesthesiologist: Eric Hurley IV, MD    Preanesthetic Checklist  Completed: patient identified, risks, benefits, and alternatives discussed, timeout performed, consent obtained, at patient's request, airway assessed, oxygen available, suction available, emergency drugs available and hand hygiene performed  Spinal Block  Patient position: sitting  Prep: ChloraPrep  Patient monitoring: heart rate, cardiac monitor, continuous pulse ox and blood pressure  Location: L4-5  Injection technique: single-shot  Needle type: Quincke   Needle gauge: 25 G

## 2021-05-31 NOTE — ANESTHESIA POSTPROCEDURE EVALUATION
Patient: Leena Glover  CONVERSION OF RIGHT PATELLA FEMORAL JOINT TO, TO TOTAL KNEE ARTHROPLASTY  Anesthesia type: spinal    Patient location: PACU  Last vitals:   Vitals Value Taken Time   /73 8/1/2019  1:20 PM   Temp 36.4  C (97.5  F) 8/1/2019  1:20 PM   Pulse 83 8/1/2019  1:20 PM   Resp 16 8/1/2019  1:20 PM   SpO2 95 % 8/1/2019  1:30 PM     Post vital signs: stable  Level of consciousness: awake and responds to simple questions  Post-anesthesia pain: pain controlled  Post-anesthesia nausea and vomiting: no  Pulmonary: unassisted, return to baseline  Cardiovascular: stable and blood pressure at baseline  Hydration: adequate  Anesthetic events: no    QCDR Measures:  ASA# 11 - Coretta-op Cardiac Arrest: ASA11B - Patient did NOT experience unanticipated cardiac arrest  ASA# 12 - Coretta-op Mortality Rate: ASA12B - Patient did NOT die  ASA# 13 - PACU Re-Intubation Rate: ASA13B - Patient did NOT require a new airway mgmt  ASA# 10 - Composite Anes Safety: ASA10A - No serious adverse event    Additional Notes:

## 2021-05-31 NOTE — ANESTHESIA CARE TRANSFER NOTE
Last vitals:   Vitals:    08/01/19 1000   BP: 132/76   Pulse: 76   Resp: 16   Temp: 36.2  C (97.1  F)   SpO2: 100%     Patient's level of consciousness is drowsy  Spontaneous respirations: yes  Maintains airway independently: yes  Dentition unchanged: yes  Oropharynx: oropharynx clear of all foreign objects    QCDR Measures:  ASA# 20 - Surgical Safety Checklist: WHO surgical safety checklist completed prior to induction    PQRS# 430 - Adult PONV Prevention: 4558F - Pt received => 2 anti-emetic agents (different classes) preop & intraop  ASA# 8 - Peds PONV Prevention: NA - Not pediatric patient, not GA or 2 or more risk factors NOT present  PQRS# 424 - Coretta-op Temp Management: 4559F - At least one body temp DOCUMENTED => 35.5C or 95.9F within required timeframe  PQRS# 426 - PACU Transfer Protocol: - Transfer of care checklist used  ASA# 14 - Acute Post-op Pain: ASA14B - Patient did NOT experience pain >= 7 out of 10

## 2021-05-31 NOTE — ANESTHESIA PREPROCEDURE EVALUATION
Anesthesia Evaluation      Patient summary reviewed   No history of anesthetic complications     Airway   Mallampati: II   Pulmonary - normal exam   (+) asthma  sleep apnea,                          Cardiovascular - negative ROS and normal exam   Neuro/Psych - negative ROS     Endo/Other       GI/Hepatic/Renal    (+) GERD,   chronic renal disease,           Dental                         Anesthesia Plan  Planned anesthetic: general endotracheal    ASA 3   Induction: intravenous   Anesthetic plan and risks discussed with: patient

## 2021-05-31 NOTE — PROGRESS NOTES
"[facility]    Facility:   [facility] [[id number]]   Code Status: FULL CODE and POLST AVAILABLE      CHIEF COMPLAINT/REASON FOR VISIT:  Chief Complaint   Patient presents with     Review Of Multiple Medical Conditions     TCU intake       HISTORY:      HPI: Leena is a 68 y.o. male who  has a past medical history of Acid reflux, ADD (attention deficit disorder), Anxiety, Arthritis, Cancer (H), Depression, Hyperlipidemia, Moderate asthma without complication, unspecified whether persistent, and Sleep apnea. He also has no past medical history of Family history of malignant hyperthermia, History of anesthesia complications, History of blood clots, History of transfusion, PONV (postoperative nausea and vomiting), Seizures (H), or Stroke (H). Leena was recently hospitalized at [facility] for an infection of his recently replaced right knee joint. He was initially hospitalized from 8/1/2019 to 8/3/2019 and was discharged home after a successful surgery. He was then readmitted with infection on 8/23/2019 and discharged to TCU on 8/29/2019. The discharging provider summarized the hospitalization as follows:     \"68 y.o. male with past medical history of sleep apnea CPAP anxiety ADHD, GERD, recent right total knee arthroplasty on 8/1/2019 who was admitted on 8/23/2019 for right prosthetic knee joint infection directly admitted from [facility].He had I&D poly-exchange. surgical cultures staph aureus, Streptococcus agalactiae, MSSA bacteremia.  Follow-up Blood cultures from 8/25 so far negative. MARCIA 8/27 no vegetations.  He is discharging on Ancef for 6 weeks, rifampin via PICC line.  He will have weekly labs and faxed to infectious disease.  He is discharged to transitional care unit.  He had acute blood loss anemia and chronic anemia and started on iron.  On admission he did have a urticarial rash exact trigger is unclear.  Resolved with Solu-Medrol, Benadryl.  Rifampin may " "significantly reduce the effectiveness of oxycodone, bupropion.  Pain medications may need to be adjusted.\"    Today Live is being evaluated for an intake to the TCU. Live states that he has been relatively independent and has been working hard to regain strength. He feels he is getting stronger and that he really would like to go home soon. Live has been eating, drinking and eliminating well. He shares that he does have one issue and that is pain management. He was on oxycodone scheduled regularly, however it was changed to as needed upon hospital discharge. He isn't sure why it was changed and is having significant pain. He shares that pain was much better managed when it was scheduled. Discharge planning did note that pain medications may need to be adjusted due to effectiveness of oxycodone when mixed with rifampin. Will schedule accordingly and will attempt to wean from pain medication. Otherwise no concerns. He denies any other concerns including fevers/chills, cough or cold symptoms, headaches, vision changes, chest pain/pressure, difficulty breathing, SOB, abdominal pain, nausea, vomiting, diarrhea, dysuria, increasing weakness, increasing pain--pain has been constant.     Advanced Care Planning  Spoke with Leena regarding code status and advanced care planning. Discussed that he would like to have full resuscitation efforts. He would also like to have treatment if he were to fall ill. He would like full medical treatment for all medical issues and family would decide for him if he was unable to decide. He shares that his wife Shannan would be the person to make the decision if he was ill.     Past Medical History:   Diagnosis Date     Acid reflux      ADD (attention deficit disorder)      Anxiety      Arthritis      Cancer (H)     prostate     Depression      Hyperlipidemia      Moderate asthma without complication, unspecified whether persistent      Sleep apnea              Family History   Problem Relation " Age of Onset     Heart disease Mother      Prostate cancer Father      Stroke Father      Social History     Socioeconomic History     Marital status:      Spouse name: None     Number of children: None     Years of education: None     Highest education level: None   Occupational History     None   Social Needs     Financial resource strain: None     Food insecurity:     Worry: None     Inability: None     Transportation needs:     Medical: None     Non-medical: None   Tobacco Use     Smoking status: Never Smoker     Smokeless tobacco: Never Used   Substance and Sexual Activity     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     Drug use: No     Sexual activity: None   Lifestyle     Physical activity:     Days per week: None     Minutes per session: None     Stress: None   Relationships     Social connections:     Talks on phone: None     Gets together: None     Attends [employer] service: None     Active member of club or organization: None     Attends meetings of clubs or organizations: None     Relationship status: None     Intimate partner violence:     Fear of current or ex partner: None     Emotionally abused: None     Physically abused: None     Forced sexual activity: None   Other Topics Concern     None   Social History Narrative     None       REVIEW OF SYSTEM:  Pertinent items are noted in HPI.    PHYSICAL EXAM:   /70   Pulse (!) 105   Temp 98.7  F (37.1  C)   Resp 18   SpO2 96%   General appearance: alert, appears stated age and cooperative  HEENT: Head is normocephalic with normal hair distribution. No evidence of trauma. Ears: Without lesions or deformity. No acute purulent discharge. Eyes: Conjunctivae pink with no scleral icterus or erythema. Nose: Normal mucosa and septum. Oropharnyx: mmm, no lesions present.  Lungs: clear to auscultation bilaterally, respirations without effort  Heart: regular rate and rhythm, S1, S2 normal, no murmur, click, rub or gallop  Abdomen: soft,  non-tender; bowel sounds normal; no masses,  no organomegaly  Extremities: extremities normal, atraumatic, no cyanosis or edema  Pulses: 2+ and symmetric  Skin: Skin color, texture, turgor normal. No rashes or lesions. Incision to right knee is bandaged-clean, dry and intact.   Neurologic: Grossly normal   Psych: interacts well with caregivers, exhibits logical thought processes and connections, pleasant      LABS:   None today.     ASSESSMENT:      ICD-10-CM    1. Prosthetic joint infection, initial encounter (H) T84.50XA    2. Physical deconditioning R53.81    3. S/P knee surgery Z98.890    4. Advanced care planning/counseling discussion Z71.89        PLAN:    Physical Deconditioning  -Continue PT/OT and other therapies as per care plan.  -Encouraged good nutrition and movement habits.   -Discussed care plan and expected course of stay.   -Continue to follow-up per routine schedule or sooner if needed.     S/p Right Knee Replacement/Right Knee Surgery  -Tylenol 1000 mg by mouth every 6 hours.   - mg by mouth two times a day.   -Hydroxyzine 25 mg by mouth with pain medications.   -Ferrous Sulfate 325 mg by mouth daily, plan to change to three times weekly.   -Change Oxycodone to 10 mg by mouth every 4 hours scheduled through Tuesday when re-evaluated by Dr. Metzger.     Prosthetic Joint Infection  -Cefazolin 2 g IV three times a day.   -Rifampin 450 mg by mouth two times a day.   -Follow with ID and with ortho.     Advanced Care Planning  -POLST reviewed and signed.   -Full Code.     Admission history and physical per MD in the next 30 days. At this time continue current care plan for all chronic medical conditions, as they are stable. Encouraged patient to engage in PT/OT for strengthening and conditioning. Encouraged patient to work closely with nursing staff to ensure any medical complaints are quickly addressed. Follow up this week or sooner if needed. Will continue to monitor patient and work with  nursing staff collaboratively to work toward positive patient outcomes.    Total unit/floor time of 35 minutes time consisted of the following: time spent with patient, examination of patient, reviewing the record including pertinent labs and completing documentation. More than 50% of this time was spent in coordination of care time with nursing staff and other healthcare providers, this time was spent on discussion/counseling on current care plan including medical management of chronic health problems and acute health problems, education pertaining to plan, and discussion of the goals of care pertaining to the current outlined plan with nursing staff and patient. An additional 16 minutes of time was spent discussing code status, wishes for end of life care and reviewing POLST from 1215 to 1231. POLST signed and left with nursing staff.     Electronically signed by: Vidya Ibanez CNP

## 2021-05-31 NOTE — ANESTHESIA PROCEDURE NOTES
Peripheral Block    Patient location during procedure: pre-op  Start time: 8/1/2019 6:50 AM  End time: 8/1/2019 6:55 AM  post-op analgesia per surgeon order as noted in medical record  Staffing:  Performing  Anesthesiologist: Eric Hurley IV, MD  Preanesthetic Checklist  Completed: patient identified, site marked, risks, benefits, and alternatives discussed, timeout performed, consent obtained, at patient's request, airway assessed, oxygen available, suction available, emergency drugs available and hand hygiene performed  Peripheral Block  Block type: saphenous, adductor canal block  Prep: ChloraPrep  Patient position: supine  Patient monitoring: cardiac monitor, continuous pulse oximetry, heart rate and blood pressure  Injection technique: ultrasound guided            Needle  Needle type: echogenic   Needle gauge: 20G  Needle length: 4 in  no peripheral nerve catheter placed  Assessment  Injection assessment: no difficulty with injection, negative aspiration for heme, no paresthesia on injection and incremental injection

## 2021-05-31 NOTE — PROGRESS NOTES
[facility]      Facility:    [place] [facility] [[id number]]    Code Status: FULL CODE   [facility]   8/1 -  8/3/2019: Right total knee arthroplasty  [facility]   8/23-8/29 /19:   Right TKA  infection      Chief Complaint/Reason for Visit:  Chief Complaint   Patient presents with     H & P     Infected right total knee arthroplasty       HPI:   Leena is a 68 y.o. male medical history of ADD, anxiety/depression, alcoholism in remission, GERD, obstructive sleep apnea on CPAP.  Who has a history of osteoarthritis, with a previous left total hip, left and right patella , meniscus tear,.    He had a conversion of the right patellofemoral joint to a right total knee arthroplasty with Dr. Polanco on 8/1/2019.    He developed red knee, draining from the incision.  He was seen at [facility] on 8/23, and was sent directly to the hospital.  He was admitted,  Dr Mei performed incision and drainage, poly-exchange secondary to Streptococcus agalactiae, MSSA.  He did have MSSA bacteremia., HLD  Ancef IV was planned for 6 weeks, with oral rifampin.    Transesophageal echo: No vegetations, normal ejection fraction equals 60%    Acute blood loss anemia, was started on iron orally.  NOTE: Rifampin could cause decrease in effectiveness of Oxycodone and buprorion.      Past Medical History:  Past Medical History:   Diagnosis Date     Acid reflux      ADD (attention deficit disorder)      Anxiety      Arthritis      Cancer (H)     prostate     Depression      Hyperlipidemia      Moderate asthma without complication, unspecified whether persistent      Sleep apnea            Surgical History:  Past Surgical History:   Procedure Laterality Date     APPENDECTOMY       CATARACT EXTRACTION       JOINT REPLACEMENT Right 2012    knee cap replaced     JOINT REPLACEMENT Left 2007    KRISTEN, socket replaced     PICC AND MIDLINE TEAM LINE INSERTION  8/28/2019          MA EXPLOR/DRAIN KNEE,INFECTN Right  8/23/2019    Procedure: INCISION AND DRAINAGE/DEBRIDMENT OF RIGHT TOTAL KNEE REPLACMENT, EXCHANGE POLY;  Surgeon: Konstantin Mei MD;  Location: [facility] OR;  Service: Orthopedics     MN TOTAL KNEE ARTHROPLASTY Right 8/1/2019    Procedure: TO TOTAL KNEE ARTHROPLASTY;  Surgeon: Chris Polanco MD;  Location: [facility] OR;  Service: Orthopedics       Family History:   Family History   Problem Relation Age of Onset     Heart disease Mother      Prostate cancer Father      Stroke Father      + Depression                                                Father and sister        + CHF/valve problems                                  Mother    + Breast cancer                                            Sister    + Diabetes                                                     Father      Social History     Socioeconomic History     Marital status:      Spouse name: Not on file     Number of children: Son and daughter     Years of education:      Highest education level: Graduate degree   Occupational History     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   Social Needs     Financial resource strain: Not on file     Food insecurity:     Worry: Not on file     Inability: Not on file     Transportation needs:     Medical: Not on file     Non-medical: Not on file   Tobacco Use     Smoking status: Never Smoker     Smokeless tobacco: Never Used   Substance and Sexual Activity     Alcohol use: No: Alcoholic in remission     Comment: sober since 2010     Drug use: No     Sexual activity: Not on file   Lifestyle     Physical activity:     Days per week: Not on file     Minutes per session: Not on file     Stress: Not on file   Relationships     Social connections:     Talks on phone: Not on file     Gets together: Not on file     Attends Anabaptism service: Not on file     Active member of club or organization: Not on file     Attends meetings of clubs or organizations: Not on file     Relationship status: Not on file     Intimate partner  "violence:     Fear of current or ex partner: Not on file     Emotionally abused: Not on file     Physically abused: Not on file     Forced sexual activity: Not on file       Review of Systems   CPM machine at the TCU is not working (flexes, does not extend)  Did not get his first dose of oxycodone at the TCU for over 12 hours due to delivery issue from the pharmacy  He was on oxycodone 10 mg 4 times a day, with \"decent\" pain coverage.  He was under the impression that BLANCHE Cedeno, was going to continue that for a few days, but she decreased it to 5 mg scheduled with 5 mg as needed.however PRN is very delayed in being given.    He has had much higher levels of pain, therapy has been painful, he tried to walk independently as he has a green tag on his walker, but the pain was quite intense   Was constipated the whole time in the hospital, but has had large bowel movement  He is being awakened at 1 AM for a dose of cephalexin through his PICC line, he requested to be changed to a time that does not involve waking him up.  He expresses frustration and discouragement with his time in the TCU    The remainder of the comprehensive review of systems is negative      Also noted in the discharge summary, rifampin can decrease the effectiveness of oxycodone    Vitals:    09/01/19 2200   BP: 126/64   Pulse: 72   Resp: 16   Temp: 96.8  F (36  C)   SpO2: 95%       Physical Exam   Constitutional: He is oriented to person, place, and time. He appears well-developed and well-nourished. He appears distressed.   HENT:   Nose: Nose normal.   Dry oral membranes  Grey tooth discoloration   Eyes: Conjunctivae and EOM are normal. No scleral icterus.   Cardiovascular: Regular rhythm and normal heart sounds.   No murmur heard.  Pulmonary/Chest: Breath sounds normal.   Musculoskeletal: He exhibits edema and tenderness. He exhibits no deformity.   Expected soft tissue swelling and warmth around his distal femur proximal tibial area.  Dressing " from surgery with 1/2 cm diameter dried blood spot  PICC line in his right upper medial arm   Lymphadenopathy:     He has no cervical adenopathy.   Neurological: He is alert and oriented to person, place, and time. He exhibits normal muscle tone. Coordination normal.   Skin: Skin is warm and dry. No rash noted.   Bites his fingernails short   Psychiatric: He has a normal mood and affect. His behavior is normal. Thought content normal.         Allergies   Allergen Reactions     Prilosec [Omeprazole] Diarrhea       Medication List:  Current Outpatient Medications   Medication Sig     oxyCODONE (ROXICODONE) 5 MG immediate release tablet Take 5 mg by mouth every 4 (four) hours as needed for pain.     acetaminophen (TYLENOL) 500 MG tablet Take 500-1,000 mg by mouth every 6 (six) hours as needed for pain.     ascorbic acid (VITAMIN C) 500 MG tablet Take 500 mg by mouth at bedtime.            aspirin 325 MG tablet Take 1 tablet (325 mg total) by mouth 2 (two) times a day with meals.     atorvastatin (LIPITOR) 40 MG tablet Take 20 mg by mouth bedtime.     buPROPion (WELLBUTRIN XL) 150 MG 24 hr tablet Take 450 mg by mouth daily.     ceFAZolin in D5W (ANCEF) 2 gram/100 mL PgBk Infuse 100 mL (2 g total) into a venous catheter every 8 (eight) hours.     doxazosin (CARDURA) 4 MG tablet Take 4 mg by mouth at bedtime.     esomeprazole (NEXIUM) 40 MG capsule Take 40 mg by mouth 2 (two) times a day.     ferrous sulfate 325 (65 FE) MG tablet Take 1 tablet (325 mg total) by mouth daily with breakfast.     hydrOXYzine HCl (ATARAX) 25 MG tablet Take 1 tablet (25 mg total) by mouth every 4 (four) hours as needed (pain, muscle spasms, itching, anxiety).     levomilnacipran (FETZIMA) 80 mg Cs24 ER capsule Take 120 mg by mouth daily.            methylphenidate HCl (RITALIN) 10 MG tablet Take 2 tablets (20 mg total) by mouth every morning.     methylphenidate HCl (RITALIN) 10 MG tablet Take 1 tablet (10 mg total) by mouth daily with lunch.  At noon     oxyCODONE (ROXICODONE) 5 MG immediate release tablet Take 1-2 tablets (5-10 mg total) by mouth every 4 (four) hours as needed. Take 1 tab for pain 1-6/10, take 2 tabs for pain 7-10/10. (Patient taking differently: Take 10 mg by mouth 3 (three) times a day. 7AM and NOON to cover therapies, 4 PM to cover CPM, then 5 mg po at 8P scheduled    Again: Rifampin decreases effectiveness of Oxycodone      )     psyllium (METAMUCIL) 0.52 gram capsule Take 1.04 g by mouth 2 (two) times a day.     rifAMPin (RIMACTANE) 150 MG capsule Take 3 capsules (450 mg total) by mouth 2 (two) times a day.     senna-docusate (PERICOLACE) 8.6-50 mg tablet Take 1 tablet by mouth 2 (two) times a day as needed for constipation.       Labs:    Ref Range & Units 8/28/19 0740 8/26/19 0420    Hemoglobin 14.0 - 18.0 g/dL 8.4Low   7.9Low         Ref Range & Units 8/23/19 1738 8/22/19 1019    CRP 0.0 - 0.8 mg/dL 41.3High   27.5       Ref Range & Units 8/23/19 1738 8/22/19 1019    Sed Rate 0 - 15 mm/hr 89High   36High              Ref Range & Units 8/25/19 0656 8/24/19 0612    Sodium 136 - 145 mmol/L 136  131Low      Potassium 3.5 - 5.0 mmol/L 3.9  4.2     Chloride 98 - 107 mmol/L 105  102     CO2 22 - 31 mmol/L 25  21Low      Anion Gap, Calculation 5 - 18 mmol/L 6  8     Glucose 70 - 125 mg/dL 101  108     Calcium 8.5 - 10.5 mg/dL 8.4Low   8.4Low      BUN 8 - 22 mg/dL 17  21     Creatinine 0.70 - 1.30 mg/dL 0.82  0.88     GFR MDRD Non Af Amer >60 mL/min/1.73m2 >60  >60         Ref Range & Units 8/25/19 0656 8/25/19 0613   8/24/19    WBC 4.0 - 11.0 thou/uL 6.9  10.1     Hemoglobin 14.0 - 18.0 g/dL 8.3Low   8.9Low      RDW 11.0 - 14.5 % 13.2  12.8     Platelets 140 - 440 thou/uL 228  212     Neutrophils % 50 - 70 % 73High   84High      Lymphocytes % 20 - 40 % 11Low   8Low         8/23/19  Bilirubin, Total 0.0 - 1.0 mg/dL 0.4    Calcium 8.5 - 10.5 mg/dL 8.9     Protein, Total 6.0 - 8.0 g/dL 6.1     Albumin 3.5 - 5.0 g/dL 2.7Low      Alkaline  Phosphatase 45 - 120 U/L 85     AST 0 - 40 U/L 11     ALT 0 - 45 U/L 12           Assessment / Plan:    ICD-10-CM    1. Infection of prosthetic joint, subsequent encounter: R TKA T84.50XD Cefazolin 6 weeks total, with Rifampin   2. Post-op pain G89.18 Lowered dose of Oxycodone with lower effectiveness secondary to Rifampin has caused pain increase. In crease to 10 mg during daytime hours scheduled, 5 mg HS, and continued prn   3. Depression, unspecified depression type F32.9 Bupropion effectiveness may be decreased as well by the Rifampin. Also on Fetzima   4. Attention deficit hyperactivity disorder (ADHD), unspecified ADHD type F90.9 On Ritalin   5. Gastroesophageal reflux disease without esophagitis K21.9 Nexium   6. Sleep apnea, unspecified type G47.30 Has home  CPAP with him   7. Mild intermittent asthma without complication J45.20            Electronically signed by: Rosa Metzger MD

## 2021-06-01 ENCOUNTER — RECORDS - HEALTHEAST (OUTPATIENT)
Dept: ADMINISTRATIVE | Facility: CLINIC | Age: 71
End: 2021-06-01

## 2021-06-01 NOTE — PATIENT INSTRUCTIONS - HE
Stop date for iv cefazolin is 10/6/19.  Pull PICC at that time, then start oral cephalexin 500 mg by mouth four times a day for 6 months.

## 2021-06-01 NOTE — PROGRESS NOTES
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  Patient doing well on IV cefazolin.  While I discharge the patient from the hospital on oral rifampin, patient does not recall seeing it given at his transitional care unit, nor was it on his discharge summary from the TCU.       Plan:     No orders of the defined types were placed in this encounter.       Follow up in 1 month.  Restart rifampin at 450 mg twice a day.  He can have his PICC line pulled when his IV antibiotics are finished on October 6.  Will be starting cephalexin 500 mg p.o. 4 times daily at that time.     Subjective:      This is an follow-up infectious Disease visit for Leena Glover, who is a 68 y.o.  referred for evaluation of right total knee arthroplasty infection.     He had MSSA bacteremia as well as group B strep and MSSA growing in his right total knee arthroplasty.  He had a liner exchange, but the hardware was otherwise left in place.    Patient does not recall if he took the rifampin at the transitional care unit.  He is currently at home.  They did not send him out with rifampin at the time of discharge from the TCU.    He currently denies fevers, chills, sweats, nausea, vomiting, diarrhea.  There is a spot of dried blood at the PICC line exit site on his left upper extremity.      The following portions of the patient's history were reviewed and updated as appropriate: allergies, current medications, past family history, past medical history, past social history, past surgical history and problem list.      Review of Systems  Performed and all negative except as mentioned above.      Objective:      /64 (Patient Site: Right Arm, Patient Position: Sitting, Cuff Size: Adult Regular)   Pulse 80   Temp 97.9  F (36.6  C)   Ht 6' 1\" (1.854 m)   Wt 205 lb (93 kg)   BMI 27.05 kg/m    General:   alert, appears stated age and cooperative   Oropharynx:  lips, mucosa, and tongue " normal; teeth and gums normal    Eyes:   Extraocular muscles intact, no icterus    Ears:   Deferred   Neck:  no adenopathy and supple, symmetrical, trachea midline   Thyroid:   Deferred   Lung:  Normal respiratory pattern   Heart:   Deferred   Abdomen:  Deferred   Extremities:  Right knee incision is clean dry and intact.  He has a PICC line in left upper extremity.  There is a spot of dried blood at the exit site, but no evidence of phlebitis.   Skin:  warm and dry, no hyperpigmentation, vitiligo, or suspicious lesions   CVA:   Deferred   Genitourinary:  defer exam   Neurological:   Grossly normal   Psychiatric:   normal mood, behavior, speech, dress, and thought processes         Jan De Jesus MD

## 2021-06-01 NOTE — PROGRESS NOTES
[facility]      Facility:    [facility] [[id number]]    Code Status: FULL CODE   [facility]   8/1 -  8/3/2019: Right total knee arthroplasty  [facility]   8/23-8/29 /19:   Right TKA  infection      Chief Complaint/Reason for Visit:  Chief Complaint   Patient presents with     Review Of Multiple Medical Conditions     R TKA / infection washout       HPI:   Leena is a 68 y.o. male medical history of ADD, anxiety/depression, alcoholism in remission, GERD, obstructive sleep apnea on CPAP.  Who has a history of osteoarthritis, with a previous left total hip, left and right patella , meniscus tear,.    He had a conversion of the right patellofemoral joint to a right total knee arthroplasty with Dr. Polanco on 8/1/2019.    He developed red knee, draining from the incision.  He was seen at [facility] on 8/23, and was sent directly to the hospital.  He was admitted,  Dr Mei performed incision and drainage, poly-exchange secondary to Streptococcus agalactiae, MSSA.  He did have MSSA bacteremia., HLD  Ancef IV was planned for 6 weeks, with oral rifampin.    Transesophageal echo: No vegetations, normal ejection fraction equals 60%    Acute blood loss anemia, was started on iron orally.  NOTE: Rifampin could cause decrease in effectiveness of Oxycodone and bupropion.    He transferred to [facility] for therapy.    UPDATE:  Completely independent, green tag.  He is here because he has IV antibiotics.  Nurses note erythema at the site of the PICC line      Past Medical History:  Past Medical History:   Diagnosis Date     Acid reflux      ADD (attention deficit disorder)      Anxiety      Arthritis      Cancer (H)     prostate     Depression      Hyperlipidemia      Moderate asthma without complication, unspecified whether persistent      Sleep apnea            Surgical History:  Past Surgical History:   Procedure Laterality Date     APPENDECTOMY       CATARACT EXTRACTION        JOINT REPLACEMENT Right 2012    knee cap replaced     JOINT REPLACEMENT Left 2007    KRISTEN, socket replaced     PICC AND MIDLINE TEAM LINE INSERTION  8/28/2019          CA EXPLOR/DRAIN KNEE,INFECTN Right 8/23/2019    Procedure: INCISION AND DRAINAGE/DEBRIDMENT OF RIGHT TOTAL KNEE REPLACMENT, EXCHANGE POLY;  Surgeon: Konstantin Mei MD;  Location: [facility];  Service: Orthopedics     CA TOTAL KNEE ARTHROPLASTY Right 8/1/2019    Procedure: TO TOTAL KNEE ARTHROPLASTY;  Surgeon: Chris Polanco MD;  Location: [facility];  Service: Orthopedics           Review of Systems   Using CPM machine: 95 degrees    She would like to be taught how to give his IV antibiotics himself and go home.  Nurse manager of TCU 3 is checking on the costs/coverage.      Vitals:    09/09/19 0800   BP: 130/63   Pulse: 88   Resp: 16   Temp: 96.9  F (36.1  C)   SpO2: 98%       Physical Exam    Constitutional: He is pleasant, ambulatory  Cardiovascular: Regular , normal heart sounds, no murmur   Pulmonary/Chest: Breath sounds clear  Abdomen: soft, + BS   Musculoskeletal: PICC line in his right upper medial arm with erythematous skin at the insertion site, no pus  R knee incision well healed, mildly edematous and warm but decreased from last week.  Neurological: He is alert and oriented to person, place, and time. He exhibits normal muscle tone. Coordination normal.   Skin: Skin is warm and dry. No rash noted.   Bites his fingernails short   Psychiatric: He has a normal mood,thought process      Allergies   Allergen Reactions     Prilosec [Omeprazole] Diarrhea       Medication List:  Current Outpatient Medications   Medication Sig     acetaminophen (TYLENOL) 500 MG tablet Take 500-1,000 mg by mouth every 6 (six) hours as needed for pain.     ascorbic acid (VITAMIN C) 500 MG tablet Take 500 mg by mouth at bedtime.            aspirin 325 MG tablet Take 1 tablet (325 mg total) by mouth 2 (two) times a day with meals.      atorvastatin (LIPITOR) 40 MG tablet Take 20 mg by mouth bedtime.     buPROPion (WELLBUTRIN XL) 150 MG 24 hr tablet Take 450 mg by mouth daily.     ceFAZolin in D5W (ANCEF) 2 gram/100 mL PgBk Infuse 100 mL (2 g total) into a venous catheter every 8 (eight) hours.     doxazosin (CARDURA) 4 MG tablet Take 4 mg by mouth at bedtime.     esomeprazole (NEXIUM) 40 MG capsule Take 40 mg by mouth 2 (two) times a day.     ferrous sulfate 325 (65 FE) MG tablet Take 1 tablet (325 mg total) by mouth daily with breakfast.     hydrOXYzine HCl (ATARAX) 25 MG tablet Take 1 tablet (25 mg total) by mouth every 4 (four) hours as needed (pain, muscle spasms, itching, anxiety).     levomilnacipran (FETZIMA) 80 mg Cs24 ER capsule Take 120 mg by mouth daily.            methylphenidate HCl (RITALIN) 10 MG tablet Take 2 tablets (20 mg total) by mouth every morning.     methylphenidate HCl (RITALIN) 10 MG tablet Take 1 tablet (10 mg total) by mouth daily with lunch. At noon     oxyCODONE (ROXICODONE) 5 MG immediate release tablet Take 1-2 tablets (5-10 mg total) by mouth every 4 (four) hours as needed. Take 1 tab for pain 1-6/10, take 2 tabs for pain 7-10/10. (Patient taking differently: Take 10 mg by mouth every 4 (four) hours. Again: Rifampin decreases effectiveness of Oxycodone      )     oxyCODONE (ROXICODONE) 5 MG immediate release tablet Take 5 mg by mouth every 4 (four) hours as needed for pain.     psyllium (METAMUCIL) 0.52 gram capsule Take 1.04 g by mouth 2 (two) times a day.     rifAMPin (RIMACTANE) 150 MG capsule Take 3 capsules (450 mg total) by mouth 2 (two) times a day.     senna-docusate (PERICOLACE) 8.6-50 mg tablet Take 1 tablet by mouth 2 (two) times a day as needed for constipation.       Labs:      Ref Range & Units 9/3/19 0609    WBC 4.0 - 11.0 thou/uL 10.1     Hemoglobin 14.0 - 18.0 g/dL 9.2Low      Platelets 140 - 440 thou/uL 372     MPV 8.5 - 12.5 fL 8.1Low      Neutrophils % 50 - 70 % 72High        Ref Range & Units  9/3/19 0609 8/23/19 1738 8/22/19 1019     CRP 0.0 - 0.8 mg/dL 4.9High   41.3High   27.5High         Ref Range & Units 9/3/19    Creatinine 0.70 - 1.30 mg/dL 0.83     GFR MDRD Non Af Amer >60 mL/min/1.73m2 >60          Ref Range & Units 9/3/19  8/23/19     Bilirubin, Total 0.0 - 1.0 mg/dL 0.2  0.4     Bilirubin, Direct <=0.5 mg/dL 0.1      Protein, Total 6.0 - 8.0 g/dL 5.8Low   6.1     Albumin 3.5 - 5.0 g/dL 2.5Low   2.7Low      Alkaline Phosphatase 45 - 120 U/L 95  85     AST 0 - 40 U/L 12  11     ALT 0 - 45 U/L <9  12              Ref Range & Units 8/28/19 0740 8/26/19 0420    Hemoglobin 14.0 - 18.0 g/dL 8.4Low   7.9Low         Ref Range & Units 8/23/19 1738 8/22/19 1019    CRP 0.0 - 0.8 mg/dL 41.3High   27.5       Ref Range & Units 8/23/19 1738 8/22/19 1019    Sed Rate 0 - 15 mm/hr 89High   36High              Ref Range & Units 8/25/19 0656 8/24/19 0612    Sodium 136 - 145 mmol/L 136  131Low      Potassium 3.5 - 5.0 mmol/L 3.9  4.2     Chloride 98 - 107 mmol/L 105  102     CO2 22 - 31 mmol/L 25  21Low      Anion Gap, Calculation 5 - 18 mmol/L 6  8     Glucose 70 - 125 mg/dL 101  108     Calcium 8.5 - 10.5 mg/dL 8.4Low   8.4Low      BUN 8 - 22 mg/dL 17  21     Creatinine 0.70 - 1.30 mg/dL 0.82  0.88     GFR MDRD Non Af Amer >60 mL/min/1.73m2 >60  >60         Ref Range & Units 8/25/19 0656 8/25/19 0613   8/24/19    WBC 4.0 - 11.0 thou/uL 6.9  10.1     Hemoglobin 14.0 - 18.0 g/dL 8.3Low   8.9Low      RDW 11.0 - 14.5 % 13.2  12.8     Platelets 140 - 440 thou/uL 228  212     Neutrophils % 50 - 70 % 73High   84High      Lymphocytes % 20 - 40 % 11Low   8Low         8/23/19  Bilirubin, Total 0.0 - 1.0 mg/dL 0.4    Calcium 8.5 - 10.5 mg/dL 8.9     Protein, Total 6.0 - 8.0 g/dL 6.1     Albumin 3.5 - 5.0 g/dL 2.7Low      Alkaline Phosphatase 45 - 120 U/L 85     AST 0 - 40 U/L 11     ALT 0 - 45 U/L 12           Assessment / Plan:    ICD-10-CM    1. Infection of prosthetic joint, subsequent encounter: R TKA T84.50XD Cefazolin  6 weeks total, with Rifampin.  Will have PICC line changed, especially with new hardware in right knee   2. Post-op pain G89.18  Oxycodone with lower  10 mg during daytime hours scheduled, 5 mg HS, and continued prn   3. Depression, unspecified depression type F32.9 Bupropion effectiveness may be decreased as well by the Rifampin. Also on Fetzima   4. Attention deficit hyperactivity disorder (ADHD), unspecified ADHD type F90.9 On Ritalin   5. Gastroesophageal reflux disease without esophagitis K21.9 Nexium   6. Sleep apnea, unspecified type G47.30 Has home  CPAP with him           Electronically signed by: Rosa Metzger MD

## 2021-06-01 NOTE — PROGRESS NOTES
[facility]      NAME:  Leena Glover             :  1950    MRN: [id number]    CODE STATUS:  FULL CODE    FACILITY: [facility] [[id number]]       CHIEF COMPLAIN/REASON FOR VISIT:  Chief Complaint   Patient presents with     Review Of Multiple Medical Conditions       HISTORY OF PRESENT ILLNESS: Leena Glover is a 68 y.o. male being seen today for a review of multiple medical conditions. Pt was at [facility] for an infection of his recently replaced right knee joint. He was initially hospitalized from 2019 to 8/3/2019 and was discharged home after a successful surgery. He was then readmitted with infection on 2019 and discharged to TCU on 2019  He has a past medical history of Acid reflux, ADD (attention deficit disorder), Anxiety, Arthritis, Cancer (H), Depression, Hyperlipidemia, Moderate asthma without complication, unspecified whether persistent, and Sleep apnea. He also has no past medical history of Family history of malignant hyperthermia, History of anesthesia complications, History of blood clots, History of transfusion, PONV (postoperative nausea and vomiting), Seizures (H), or Stroke (H). We discussed IVAB training for family today so he could go home.     Allergies   Allergen Reactions     Prilosec [Omeprazole] Diarrhea   :     Current Outpatient Medications   Medication Sig     acetaminophen (TYLENOL) 500 MG tablet Take 500-1,000 mg by mouth every 6 (six) hours as needed for pain.     ascorbic acid (VITAMIN C) 500 MG tablet Take 500 mg by mouth at bedtime.            aspirin 325 MG tablet Take 1 tablet (325 mg total) by mouth 2 (two) times a day with meals.     atorvastatin (LIPITOR) 40 MG tablet Take 20 mg by mouth bedtime.     buPROPion (WELLBUTRIN XL) 150 MG 24 hr tablet Take 450 mg by mouth daily.     ceFAZolin in D5W (ANCEF) 2 gram/100 mL PgBk Infuse 100 mL (2 g total) into a venous catheter every 8 (eight) hours.     doxazosin  (CARDURA) 4 MG tablet Take 4 mg by mouth at bedtime.     esomeprazole (NEXIUM) 40 MG capsule Take 40 mg by mouth 2 (two) times a day.     ferrous sulfate 325 (65 FE) MG tablet Take 1 tablet (325 mg total) by mouth daily with breakfast.     hydrOXYzine HCl (ATARAX) 25 MG tablet Take 1 tablet (25 mg total) by mouth every 4 (four) hours as needed (pain, muscle spasms, itching, anxiety).     levomilnacipran (FETZIMA) 80 mg Cs24 ER capsule Take 120 mg by mouth daily.            methylphenidate HCl (RITALIN) 10 MG tablet Take 2 tablets (20 mg total) by mouth every morning.     methylphenidate HCl (RITALIN) 10 MG tablet Take 1 tablet (10 mg total) by mouth daily with lunch. At noon     oxyCODONE (ROXICODONE) 5 MG immediate release tablet Take 5 mg by mouth every 4 (four) hours as needed for pain.     oxyCODONE (ROXICODONE) 5 MG immediate release tablet 10 mg PO three times a day and 5 mg at bedtime, and 1 tablet every 4 hours as needed for pain.     psyllium (METAMUCIL) 0.52 gram capsule Take 1.04 g by mouth 2 (two) times a day.     rifAMPin (RIMACTANE) 150 MG capsule Take 3 capsules (450 mg total) by mouth 2 (two) times a day.     senna-docusate (PERICOLACE) 8.6-50 mg tablet Take 1 tablet by mouth 2 (two) times a day as needed for constipation.         REVIEW OF SYSTEMS:    Currently, no fever, chills, or rigors. Does not have any visual or hearing problems. Denies any chest pain, headaches, palpitations, lightheadedness, dizziness, shortness of breath, or cough. Appetite is good. Denies any GERD symptoms. Denies any difficulty with swallowing, nausea, or vomiting.  Denies any abdominal pain, diarrhea or constipation. Denies any urinary symptoms. No insomnia. No active bleeding. No rash.       PHYSICAL EXAMINATION:  Vitals:    09/11/19 2032   BP: 119/59   Pulse: 88   Temp: 97.2  F (36.2  C)   Weight: 196 lb 12.8 oz (89.3 kg)         GENERAL: Awake, Alert, oriented x3, not in any form of acute distress, answers questions  appropriately, follows simple commands, conversant  HEENT: Head is normocephalic with normal hair distribution. No evidence of trauma. Ears: No acute purulent discharge. Eyes: Conjunctivae pink with no scleral jaundice. Nose: Normal mucosa and septum. NECK: Supple with no cervical or supraclavicular lymphadenopathy. Trachea is midline.   CHEST: No tenderness or deformity, no crepitus  LUNG: Clear to auscultation with good chest expansion. There are no crackles or wheezes, normal AP diameter.  BACK: No kyphosis of the thoracic spine. Symmetric, no curvature, ROM normal, no CVA tenderness, no spinal tenderness   CVS: There is good S1  S2, there are no murmurs, rubs, gallops, or heaves, rhythm is regular.  ABDOMEN: Globular and soft, nontender to palpation, non distended, no masses, no organomegaly, good bowel sounds, no rebound or guarding, no peritoneal signs.   EXTREMITIES: Atraumatic. Full range of motion on both upper and lower extremities, there is no tenderness to palpation, no pedal edema, no cyanosis or clubbing, no calf tenderness, normal cap refill, no joint swelling.  SKIN: Warm and dry, no erythema noted, no rashes or lesions.  NEUROLOGICAL: The patient is oriented to person, place and time. Strength and sensation are grossly intact. Face is symmetric.                    LABS:    Lab Results   Component Value Date    WBC 7.3 09/10/2019    HGB 10.1 (L) 09/10/2019    HCT 33.2 (L) 09/10/2019    MCV 94 09/10/2019     09/10/2019       Results for orders placed or performed during the hospital encounter of 08/23/19   Basic Metabolic Panel   Result Value Ref Range    Sodium 136 136 - 145 mmol/L    Potassium 3.9 3.5 - 5.0 mmol/L    Chloride 105 98 - 107 mmol/L    CO2 25 22 - 31 mmol/L    Anion Gap, Calculation 6 5 - 18 mmol/L    Glucose 101 70 - 125 mg/dL    Calcium 8.4 (L) 8.5 - 10.5 mg/dL    BUN 17 8 - 22 mg/dL    Creatinine 0.82 0.70 - 1.30 mg/dL    GFR MDRD Af Amer >60 >60 mL/min/1.73m2    GFR MDRD Non  Af Amer >60 >60 mL/min/1.73m2           No results found for: HGBA1C  No results found for: [id number]  No results found for: [id number]    ASSESSMENT/PLAN:  1. Prosthetic joint infection, subsequent  encounter (H)    2. Physical deconditioning      1. Prosthetic joint infection: Ancef 2 gmsq 8 hrs x 2 more weeks. DC planning today for family to assist with IV training to complete regime at home. He is to complete therapy with PT this week and possible dc home Friday. Knee remains swollen, sutures in place. PICC in place with CDI dressing    2. Physical decondition: Continue rehab services , reports he feel he has improved enough for dc by end of week. He will continue on IVAB at home.      Electronically signed by:  Lily Sanford CNP  This progress note was completed using Dragon software and there may be grammatical errors.

## 2021-06-01 NOTE — PROGRESS NOTES
"[facility]    Facility:   [facility] [[id number]]   Code Status: FULL CODE and POLST AVAILABLE      CHIEF COMPLAINT/REASON FOR VISIT:  Chief Complaint   Patient presents with     Review Of Multiple Medical Conditions     physical deconditioning, uncontrolled pain, right knee pain, right knee infection s/p RTKA       HISTORY:      HPI: Leena is a 68 y.o. male who  has a past medical history of Acid reflux, ADD (attention deficit disorder), Anxiety, Arthritis, Cancer (H), Depression, Hyperlipidemia, Moderate asthma without complication, unspecified whether persistent, and Sleep apnea. He also has no past medical history of Family history of malignant hyperthermia, History of anesthesia complications, History of blood clots, History of transfusion, PONV (postoperative nausea and vomiting), Seizures (H), or Stroke (H). Leena was recently hospitalized at [facility] for an infection of his recently replaced right knee joint. He was initially hospitalized from 8/1/2019 to 8/3/2019 and was discharged home after a successful surgery. He was then readmitted with infection on 8/23/2019 and discharged to TCU on 8/29/2019. The discharging provider summarized the hospitalization as follows:     \"68 y.o. male with past medical history of sleep apnea CPAP anxiety ADHD, GERD, recent right total knee arthroplasty on 8/1/2019 who was admitted on 8/23/2019 for right prosthetic knee joint infection directly admitted from [facility].He had I&D poly-exchange. surgical cultures staph aureus, Streptococcus agalactiae, MSSA bacteremia.  Follow-up Blood cultures from 8/25 so far negative. MARCIA 8/27 no vegetations.  He is discharging on Ancef for 6 weeks, rifampin via PICC line.  He will have weekly labs and faxed to infectious disease.  He is discharged to transitional care unit.  He had acute blood loss anemia and chronic anemia and started on iron.  On admission he did have a urticarial rash " "exact trigger is unclear.  Resolved with Solu-Medrol, Benadryl.  Rifampin may significantly reduce the effectiveness of oxycodone, bupropion.  Pain medications may need to be adjusted.\"    Today Live is being evaluated for a routine review of medical problems while in TCU. He continues to share that he has uncontrolled pain at times. He is requesting oxycodone refill and does not believe that he can start to taper off of oxycodone. He feels it is still needed. Did talk about alternative therapies such as ice, massage, compression. Live states other than that he is doing well. Therapies have been going well. He does feel stronger and more balanced. He has been eating, drinking and eliminating well. He denies any other concerns including fevers/chills, cough or cold symptoms, headaches, vision changes, chest pain/pressure, difficulty breathing, SOB, abdominal pain, nausea, vomiting, diarrhea, dysuria, increasing weakness.     Past Medical History:   Diagnosis Date     Acid reflux      ADD (attention deficit disorder)      Anxiety      Arthritis      Cancer (H)     prostate     Depression      Hyperlipidemia      Moderate asthma without complication, unspecified whether persistent      Sleep apnea              Family History   Problem Relation Age of Onset     Heart disease Mother      Prostate cancer Father      Stroke Father      Social History     Socioeconomic History     Marital status:      Spouse name: None     Number of children: None     Years of education: None     Highest education level: None   Occupational History     None   Social Needs     Financial resource strain: None     Food insecurity:     Worry: None     Inability: None     Transportation needs:     Medical: None     Non-medical: None   Tobacco Use     Smoking status: Never Smoker     Smokeless tobacco: Never Used   Substance and Sexual Activity     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     Drug use: No     Sexual activity: None "   Lifestyle     Physical activity:     Days per week: None     Minutes per session: None     Stress: None   Relationships     Social connections:     Talks on phone: None     Gets together: None     Attends Holiness service: None     Active member of club or organization: None     Attends meetings of clubs or organizations: None     Relationship status: None     Intimate partner violence:     Fear of current or ex partner: None     Emotionally abused: None     Physically abused: None     Forced sexual activity: None   Other Topics Concern     None   Social History Narrative     None       REVIEW OF SYSTEM:  Pertinent items are noted in HPI.    PHYSICAL EXAM:   /59   Pulse 88   Temp 97.2  F (36.2  C)   Resp 18   Wt 196 lb 12.8 oz (89.3 kg)   SpO2 96%   BMI 25.96 kg/m    General appearance: alert, appears stated age and cooperative  HEENT: Head is normocephalic with normal hair distribution. No evidence of trauma. Ears: Without lesions or deformity. No acute purulent discharge. Eyes: Conjunctivae pink with no scleral icterus or erythema. Nose: Normal mucosa and septum. Oropharnyx: mmm, no lesions present.  Lungs: clear to auscultation bilaterally, respirations without effort  Heart: regular rate and rhythm, S1, S2 normal, no murmur, click, rub or gallop  Abdomen: soft, non-tender; bowel sounds normal; no masses,  no organomegaly  Extremities: extremities normal, right knee swollen not pitting.   Pulses: 2+ and symmetric  Skin: Skin color, texture, turgor normal. No rashes or lesions. Incision to right knee is bandaged-clean, dry and intact.   Neurologic: Grossly normal   Psych: interacts well with caregivers, exhibits logical thought processes and connections, pleasant      LABS:   None today.     ASSESSMENT:      ICD-10-CM    1. Chronic pain of right knee M25.561     G89.29    2. S/P knee surgery Z98.890    3. Physical deconditioning R53.81    4. Prosthetic joint infection, initial encounter (H) T84.50XA         PLAN:    Physical Deconditioning  -Continue PT/OT and other therapies as per care plan.  -Encouraged good nutrition and movement habits.   -Discussed care plan and expected course of stay.   -Continue to follow-up per routine schedule or sooner if needed.     S/p Right Knee Replacement/Right Knee Surgery  -Tylenol 1000 mg by mouth every 6 hours.   - mg by mouth two times a day.   -Hydroxyzine 25 mg by mouth with pain medications.   -Ferrous Sulfate 325 mg by mouth daily, plan to change to three times weekly.   -Oxycodone 10 mg by mouth three times a day, 5 mg at bedtime and may have 5 mg by mouth every 4 hours as needed.      Prosthetic Joint Infection  -Cefazolin 2 g IV three times a day.   -Rifampin 450 mg by mouth two times a day.   -Follow with ID and with ortho.     UNCONTROLLED Pain--Right Knee Pain  -Right knee pain is severe due to prosthetic joint infection and concurrent use of rifampin and oxycodone.   --Oxycodone 10 mg by mouth three times a day, 5 mg at bedtime and may have 5 mg by mouth every 4 hours as needed.    -Ice knee as frequently as possible for 20 minute increments.   -PT/OT for pain.   -Massage for fluid absorption.   -Compression to help with fluid absorption.     Otherwise continue current care plan for all other chronic medical conditions, as they are stable. Encouraged patient to engage in healthy lifestyle behaviors such as engaging in social activities, exercising (PT/OT), eating well, and following care plan. Follow up for routine check-up, or sooner if needed. Will continue to monitor patient and work with nursing staff collaboratively to work toward positive patient outcomes.    Electronically signed by: Vidya Ibanez CNP

## 2021-06-02 NOTE — PROGRESS NOTES
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  Patient doing well on oral cephalexin and oral rifampin.    Plan:     Orders Placed This Encounter   Procedures     Comprehensive metabolic panel     C-reactive protein        Follow up in 6 weeks.    Restart rifampin at 450 mg twice a day.  He can have his PICC line pulled when his IV antibiotics are finished on October 6.  Will be starting cephalexin 500 mg p.o. 4 times daily at that time.     Subjective:      This is an follow-up infectious Disease visit for Leena Glover, who is a 68 y.o.  referred for evaluation of right total knee arthroplasty infection.     He had MSSA bacteremia as well as group B strep and MSSA growing in his right total knee arthroplasty.  He had a liner exchange, but the hardware was otherwise left in place.    Patient does not recall if he took the rifampin at the transitional care unit.  He is currently at home.  They did not send him out with rifampin at the time of discharge from the TCU.    He currently denies fevers, chills, sweats, nausea, vomiting, diarrhea.  There is a spot of dried blood at the PICC line exit site on his left upper extremity.     Follow-up visit on October 21, 2019:    Patient states he is doing quite well.  His right knee feels quite good.  No pain, redness, swelling.    She remains \"fired up\" about not receiving rifampin at the transitional care unit at discharge.  Did receive some paperwork from [employer].  Looks like the first page of the discharge summary was signed but not the second page.  Patient says he never received a second page.     The following portions of the patient's history were reviewed and updated as appropriate: allergies, current medications, past family history, past medical history, past social history, past surgical history and problem list.      Review of Systems  Performed and all negative except as mentioned above.    "   Objective:      /56 (Patient Site: Right Arm, Patient Position: Sitting, Cuff Size: Adult Regular)   Pulse 80   Temp 98.2  F (36.8  C)   Resp 18   Wt 199 lb (90.3 kg)   BMI 26.25 kg/m    General:   alert, appears stated age and cooperative   Oropharynx:  lips, mucosa, and tongue normal; teeth and gums normal    Eyes:   Extraocular muscles intact, no icterus    Ears:   Deferred   Neck:  no adenopathy and supple, symmetrical, trachea midline   Thyroid:   Deferred   Lung:  Normal respiratory pattern   Heart:   Deferred   Abdomen:  Deferred   Extremities:  Right knee incision is clean dry and intact.  His PICC line has been removed.  No significant swelling in the right knee.   Skin:  warm and dry, no hyperpigmentation, vitiligo, or suspicious lesions   CVA:   Deferred   Genitourinary:  defer exam   Neurological:   Grossly normal   Psychiatric:   normal mood, behavior, speech, dress, and thought processes         Jan De Jesus MD

## 2021-06-02 NOTE — TELEPHONE ENCOUNTER
Regarding 10/11/19 Ajayt encounter from patient.    Called [facility] and spoke to ROCIO Sanchez3 nurse manager. She informed me that pt had sent in a survey report of pt's experience at facility and was noted. Main issue stated was with differences in nursing care varied from each nurse. Pt was unhappy with one of nurse's routine for medications indicating prn. That was before th 9/30/19 clinic visit with us.    She called back stating found pt's d/c notes from TCU. Pt had been taking 150mg 3 caps (450mg) BID and meds were sent to pt's preferred pharmacy.  [facility] should and [facility] should have copy of these notes.

## 2021-06-03 VITALS
TEMPERATURE: 98.7 F | SYSTOLIC BLOOD PRESSURE: 131 MMHG | BODY MASS INDEX: 25.96 KG/M2 | DIASTOLIC BLOOD PRESSURE: 56 MMHG | WEIGHT: 196.8 LBS | HEART RATE: 87 BPM

## 2021-06-03 VITALS
DIASTOLIC BLOOD PRESSURE: 56 MMHG | TEMPERATURE: 98.2 F | BODY MASS INDEX: 26.25 KG/M2 | SYSTOLIC BLOOD PRESSURE: 136 MMHG | WEIGHT: 199 LBS | RESPIRATION RATE: 18 BRPM | HEART RATE: 80 BPM

## 2021-06-03 VITALS
DIASTOLIC BLOOD PRESSURE: 64 MMHG | HEART RATE: 80 BPM | WEIGHT: 205 LBS | BODY MASS INDEX: 27.17 KG/M2 | SYSTOLIC BLOOD PRESSURE: 126 MMHG | HEIGHT: 73 IN | TEMPERATURE: 97.9 F

## 2021-06-03 VITALS — HEIGHT: 73 IN | WEIGHT: 214 LBS | BODY MASS INDEX: 28.36 KG/M2

## 2021-06-03 VITALS
SYSTOLIC BLOOD PRESSURE: 119 MMHG | BODY MASS INDEX: 25.96 KG/M2 | WEIGHT: 196.8 LBS | DIASTOLIC BLOOD PRESSURE: 59 MMHG | TEMPERATURE: 97.2 F | HEART RATE: 88 BPM

## 2021-06-03 VITALS
BODY MASS INDEX: 25.96 KG/M2 | WEIGHT: 196.8 LBS | HEART RATE: 88 BPM | SYSTOLIC BLOOD PRESSURE: 119 MMHG | TEMPERATURE: 97.2 F | DIASTOLIC BLOOD PRESSURE: 59 MMHG | OXYGEN SATURATION: 96 % | RESPIRATION RATE: 18 BRPM

## 2021-06-03 VITALS — BODY MASS INDEX: 28.89 KG/M2 | HEIGHT: 73 IN | WEIGHT: 218 LBS

## 2021-06-04 VITALS
BODY MASS INDEX: 27.31 KG/M2 | TEMPERATURE: 98.1 F | DIASTOLIC BLOOD PRESSURE: 52 MMHG | WEIGHT: 207 LBS | HEART RATE: 68 BPM | SYSTOLIC BLOOD PRESSURE: 128 MMHG

## 2021-06-04 VITALS
DIASTOLIC BLOOD PRESSURE: 64 MMHG | SYSTOLIC BLOOD PRESSURE: 104 MMHG | TEMPERATURE: 98 F | WEIGHT: 208 LBS | HEART RATE: 64 BPM | BODY MASS INDEX: 27.44 KG/M2

## 2021-06-04 VITALS
TEMPERATURE: 98 F | HEART RATE: 72 BPM | BODY MASS INDEX: 27.71 KG/M2 | SYSTOLIC BLOOD PRESSURE: 106 MMHG | DIASTOLIC BLOOD PRESSURE: 60 MMHG | WEIGHT: 210 LBS

## 2021-06-05 VITALS — WEIGHT: 216.7 LBS | BODY MASS INDEX: 28.72 KG/M2 | HEIGHT: 73 IN

## 2021-06-05 NOTE — PROGRESS NOTES
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  Patient doing well on oral cephalexin and oral rifampin.      Plan:     Orders Placed This Encounter   Procedures     Comprehensive metabolic panel     C-reactive protein        Follow up in 6 weeks.    Restart rifampin at 450 mg twice a day.  Continues on this with cephalexin Continues on this with cephalexin until early April.      Subjective:      This is an follow-up infectious Disease visit for Leena Glover, who is a 69 y.o.  referred for evaluation of right total knee arthroplasty infection.     He had MSSA bacteremia as well as group B strep and MSSA growing in his right total knee arthroplasty.  He had a liner exchange, but the hardware was otherwise left in place.    Patient does not recall if he took the rifampin at the transitional care unit.  He is currently at home.  They did not send him out with rifampin at the time of discharge from the TCU.    He currently denies fevers, chills, sweats, nausea, vomiting, diarrhea.  There is a spot of dried blood at the PICC line exit site on his left upper extremity.     Follow-up visit on October 21, 2019:    Patient states he is doing quite well.  His right knee feels quite good.  No pain, redness, swelling.    She remains \"fired up\" about not receiving rifampin at the transitional care unit at discharge.  Did receive some paperwork from [employer].  Looks like the first page of the discharge summary was signed but not the second page.  Patient says he never received a second page.    Follow-up visit on December 9, 2019:    Patient states he feels like he is doing quite well.  His right knee is not bothersome.  His wound is healing nicely.  His exercise tolerance is improving.  He is swimming.    He says the Pepcid that he is taking is helping with his GI distress.    Follow-up visit on January 20, 2020:    Patient says he feels great.  No nausea, " vomiting, diarrhea, rash.  He is continuing to swim.  Faster but he admits it is doing better.     The following portions of the patient's history were reviewed and updated as appropriate: allergies, current medications, past family history, past medical history, past social history, past surgical history and problem list.      Review of Systems  Performed and all negative except as mentioned above.      Objective:      /60 (Patient Site: Right Arm, Patient Position: Sitting, Cuff Size: Adult Regular)   Pulse 72   Temp 98  F (36.7  C)   Wt 210 lb (95.3 kg)   BMI 27.71 kg/m    General:   alert, appears stated age and cooperative   Oropharynx:  lips, mucosa, and tongue normal; teeth and gums normal    Eyes:   Extraocular muscles intact, no icterus    Ears:   Deferred   Neck:  no adenopathy and supple, symmetrical, trachea midline   Thyroid:   Deferred   Lung:  Normal respiratory pattern   Heart:   Deferred   Abdomen:  Deferred   Extremities:  Right knee incision is clean dry and intact.  Wound is healing quite nicely.  Minimal swelling.   Skin:  warm and dry, no hyperpigmentation, vitiligo, or suspicious lesions   CVA:   Deferred   Genitourinary:  defer exam   Neurological:   Grossly normal   Psychiatric:   normal mood, behavior, speech, dress, and thought processes         Jan De Jesus MD

## 2021-06-06 NOTE — PROGRESS NOTES
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  Patient doing well on oral cephalexin and oral rifampin.      Plan:     Orders Placed This Encounter   Procedures     Comprehensive metabolic panel     C-reactive protein        Follow up at end of April    At beginning of April will start the rifampin and take the cephalexin down to 3 times a day.  Eventually plan to get down to once a day cephalexin or discontinue.      Subjective:      This is an follow-up infectious Disease visit for Leena Glover, who is a 69 y.o.  referred for evaluation of right total knee arthroplasty infection.     He had MSSA bacteremia as well as group B strep and MSSA growing in his right total knee arthroplasty.  He had a liner exchange, but the hardware was otherwise left in place.    Patient does not recall if he took the rifampin at the transitional care unit.  He is currently at home.  They did not send him out with rifampin at the time of discharge from the TCU.    He currently denies fevers, chills, sweats, nausea, vomiting, diarrhea.  There is a spot of dried blood at the PICC line exit site on his left upper extremity.     Follow-up visit on October 21, 2019:    Patient states he is doing quite well.  His right knee feels quite good.  No pain, redness, swelling.    She remains \"fired up\" about not receiving rifampin at the transitional care unit at discharge.  Did receive some paperwork from [facility].  Looks like the first page of the discharge summary was signed but not the second page.  Patient says he never received a second page.    Follow-up visit on December 9, 2019:    Patient states he feels like he is doing quite well.  His right knee is not bothersome.  His wound is healing nicely.  His exercise tolerance is improving.  He is swimming.    He says the Pepcid that he is taking is helping with his GI distress.    Follow-up visit on January 20, " 2020:    Patient says he feels great.  No nausea, vomiting, diarrhea, rash.  He is continuing to swim.  Faster but he admits it is doing better.    Follow-up visit on March 2, 2020:    Patient is doing quite well.  Excellent range of motion of the knee, no inflammation.  Tolerating the antibiotics without difficulty.  No nausea, vomiting, diarrhea, rash.     The following portions of the patient's history were reviewed and updated as appropriate: allergies, current medications, past family history, past medical history, past social history, past surgical history and problem list.      Review of Systems  Performed and all negative except as mentioned above.      Objective:      /64 (Patient Site: Right Arm, Patient Position: Sitting, Cuff Size: Adult Regular)   Pulse 64   Temp 98  F (36.7  C)   Wt 208 lb (94.3 kg)   BMI 27.44 kg/m    General:   alert, appears stated age and cooperative   Oropharynx:  lips, mucosa, and tongue normal; teeth and gums normal    Eyes:   Extraocular muscles intact, no icterus    Ears:   Deferred   Neck:  no adenopathy and supple, symmetrical, trachea midline   Thyroid:   Deferred   Lung:  Normal respiratory pattern   Heart:   Deferred   Abdomen:  Deferred   Extremities:  Right knee incision is clean dry and intact.  Wound is healed.  No erythema.   Skin:  warm and dry, no hyperpigmentation, vitiligo, or suspicious lesions   CVA:   Deferred   Genitourinary:  defer exam   Neurological:   Grossly normal   Psychiatric:   normal mood, behavior, speech, dress, and thought processes         Jan De Jesus MD

## 2021-06-06 NOTE — PATIENT INSTRUCTIONS - HE
In early April, stop the rifampin and decrease cephalexin to 3 times per day.    Return at end of April for follow up.

## 2021-06-07 NOTE — PATIENT INSTRUCTIONS - HE
Thanks for the visit today!  Things we discussed:  Continue the three times daily cephalexin  Labs were ordered

## 2021-06-07 NOTE — PROGRESS NOTES
"Leena Glover is a 69 y.o. male who is being evaluated via a billable telephone visit.      The patient has been notified of following:     \"This telephone visit will be conducted via a call between you and your physician/provider. We have found that certain health care needs can be provided without the need for a physical exam.  This service lets us provide the care you need with a short phone conversation.  If a prescription is necessary we can send it directly to your pharmacy.  If lab work is needed we can place an order for that and you can then stop by our lab to have the test done at a later time.    Telephone visits are billed at different rates depending on your insurance coverage. During this emergency period, for some insurers they may be billed the same as an in-person visit.  Please reach out to your insurance provider with any questions.    If during the course of the call the physician/provider feels a telephone visit is not appropriate, you will not be charged for this service.\"    Patient has given verbal consent to a Telephone visit? Yes    Patient would like to receive their AVS by AVS Preference: Macy.    Additional provider notes: Leena is doing well. His right knee has not experienced any swelling or erythema. Continues to bike and walk a mile daily without issues. He had been taking cephalexin four times a day plus rifampin until last week, when the rifampin stopped and cephalexin decreased to three times a day. He is tolerating this well. He is anxious about getting repeat labs today. Saw orthopedics who stated knee was doing great and could follow up in a year.     A/P  R TKA PJI with MSSA and GBS: previously on cephalexin four times a day and rifampin, 1 week ago transitioned to only cephalexin three times a day. Eventually plan on getting down to once daily cephalexin or discontinue. Will check labs today.   F/u Dr De Jesus in 2 months    Phone call duration: 152 minutes    Bushra " Shannan Rivera MD

## 2021-06-09 NOTE — PROGRESS NOTES
"Leena Glover is a 69 y.o. male who is being evaluated via a billable video visit.      The patient has been notified of following:     \"This video visit will be conducted via a call between you and your physician/provider. We have found that certain health care needs can be provided without the need for an in-person physical exam.  This service lets us provide the care you need with a video conversation.  If a prescription is necessary we can send it directly to your pharmacy.  If lab work is needed we can place an order for that and you can then stop by our lab to have the test done at a later time.    Video visits are billed at different rates depending on your insurance coverage. Please reach out to your insurance provider with any questions.    If during the course of the call the physician/provider feels a video visit is not appropriate, you will not be charged for this service.\"    Patient has given verbal consent to a Video visit? Yes  How would you like to obtain your AVS? AVS Preference: Tryoutshart.  Patient would like the video invitation sent by: Text to cell phone: my chart  Will anyone else be joining your video visit? No        Video Start Time: 8:51 am    Additional provider notes: GENERAL: Healthy, alert and no distress  EYES: Eyes grossly normal to inspection. No discharge or erythema, or obvious scleral/conjunctival abnormalities.  RESP: No audible wheeze, cough, or visible cyanosis.  No visible retractions or increased work of breathing.    NEURO: Cranial nerves grossly intact. Mentation and speech appropriate for age.  PSYCH: Mentation appears normal, affect normal/bright, judgement and insight intact, normal speech and appearance well-groomed    Additional provider notes: Leena is doing well. His right knee has not experienced any swelling or erythema. Continues to bike and walk a mile daily without issues. He had been taking cephalexin four times a day plus rifampin until last week, when the " rifampin stopped and cephalexin decreased to three times a day. He is tolerating this well. He is anxious about getting repeat labs today. Saw orthopedics who stated knee was doing great and could follow up in a year.     Has been riding a bike.    Denies itching, rash.  Diarrhea better since stopping rifampin.      A/P  R TKA PJI with MSSA and GBS: previously on cephalexin four times a day and rifampin, 1 week ago transitioned to only cephalexin three times a day.     Orders Placed This Encounter   Procedures     C-reactive protein     Standing Status:   Future     Standing Expiration Date:   6/29/2021     Basic metabolic panel     Standing Status:   Future     Standing Expiration Date:   6/29/2021     CBC ordered.     Follow up in 6 weeks.     Video-Visit Details    Type of service:  Video Visit    Video End Time (time video stopped): 9:01 AM  Originating Location (pt. Location): Home    Distant Location (provider location):   [facility]     Platform used for Video Visit: Aguilar De Jesus MD

## 2021-06-10 NOTE — PROGRESS NOTES
"Leena Glover is a 69 y.o. male who is being evaluated via a billable video visit.      The patient has been notified of following:     \"This video visit will be conducted via a call between you and your physician/provider. We have found that certain health care needs can be provided without the need for an in-person physical exam.  This service lets us provide the care you need with a video conversation.  If a prescription is necessary we can send it directly to your pharmacy.  If lab work is needed we can place an order for that and you can then stop by our lab to have the test done at a later time.    Video visits are billed at different rates depending on your insurance coverage. Please reach out to your insurance provider with any questions.    If during the course of the call the physician/provider feels a video visit is not appropriate, you will not be charged for this service.\"    Patient has given verbal consent to a Video visit? Yes  How would you like to obtain your AVS? AVS Preference: MyChart.  If dropped by the video visit, the video invitation should be sent to: Text to cell phone: [phone]  Will anyone else be joining your video visit? No      Video Start Time: 9:05 am    Additional provider notes: Leena is doing well. His right knee has not experienced any swelling or erythema. Continues to bike and walk a mile daily without issues. He had been taking cephalexin four times a day plus rifampin until last week, when the rifampin stopped and cephalexin decreased to three times a day. He is tolerating this well. He is anxious about getting repeat labs today. Saw orthopedics who stated knee was doing great and could follow up in a year.     Now down to twice a day on the cephalexin    Has been riding a bike.  6 miles per day.    Denies itching, rash.  Diarrhea better since stopping rifampin.      A/P  R TKA PJI with MSSA and GBS:    Now on chronic suppression two times a day with " cephalexin.    Follow up as needed.       GENERAL: Healthy, alert and no distress  EYES: Eyes grossly normal to inspection. No discharge or erythema, or obvious scleral/conjunctival abnormalities.  RESP: No audible wheeze, cough, or visible cyanosis.  No visible retractions or increased work of breathing.    NEURO: Cranial nerves grossly intact. Mentation and speech appropriate for age.  PSYCH: Mentation appears normal, affect normal/bright, judgement and insight intact, normal speech and appearance well-groomed      Video-Visit Details    Type of service:  Video Visit    Video End Time (time video stopped): 9:14 AM  Originating Location (pt. Location): Home    Distant Location (provider location):   [facility]     Platform used for Video Visit: Sherry De Jesus MD

## 2021-06-14 NOTE — ANESTHESIA PREPROCEDURE EVALUATION
Anesthesia Evaluation      Patient summary reviewed   No history of anesthetic complications     Airway   Mallampati: II  Neck ROM: full   Pulmonary - negative ROS and normal exam   (+) sleep apnea on CPAP, ,                          Cardiovascular - negative ROS and normal exam   Neuro/Psych - negative ROS     Endo/Other - negative ROS      GI/Hepatic/Renal - negative ROS           Dental - normal exam                        Anesthesia Plan  Planned anesthetic: spinal and peripheral nerve block    ASA 2     Anesthetic plan and risks discussed with: patient    Post-op plan: routine recovery

## 2021-06-14 NOTE — ANESTHESIA POSTPROCEDURE EVALUATION
Patient: Leena Glover  LEFT PATELLA FEMORAL ARTHROPLASTY WITH LATERAL RELEASE  Anesthesia type: spinal    Patient location: PACU  Last vitals:   Vitals:    12/14/17 1150   BP: 118/63   Pulse:    Resp:    Temp: 36.7  C (98  F)   SpO2:      Post vital signs: stable  Level of consciousness: awake and responds to simple questions  Post-anesthesia pain: pain controlled  Post-anesthesia nausea and vomiting: no  Pulmonary: unassisted, return to baseline  Cardiovascular: stable and blood pressure at baseline  Hydration: adequate  Anesthetic events: no    QCDR Measures:  ASA# 11 - Coretta-op Cardiac Arrest: ASA11B - Patient did NOT experience unanticipated cardiac arrest  ASA# 12 - Coretta-op Mortality Rate: ASA12B - Patient did NOT die  ASA# 13 - PACU Re-Intubation Rate: NA - No ETT / LMA used for case  ASA# 10 - Composite Anes Safety: ASA10A - No serious adverse event    Additional Notes:

## 2021-06-14 NOTE — ANESTHESIA PROCEDURE NOTES
Spinal Block    Patient location during procedure: OR  Start time: 12/14/2017 9:49 AM  End time: 12/14/2017 9:52 AM  Reason for block: primary anesthetic    Staffing:  Performing  Anesthesiologist: PATRIC RUCKER    Preanesthetic Checklist  Completed: patient identified, risks, benefits, and alternatives discussed, timeout performed, consent obtained, airway assessed, oxygen available, suction available, emergency drugs available and hand hygiene performed  Spinal Block  Patient position: sitting  Prep: ChloraPrep and site prepped and draped  Patient monitoring: blood pressure and continuous pulse ox  Approach: midline  Location: L3-4  Injection technique: single-shot  Needle type: pencil-tip   Needle gauge: 24 G

## 2021-06-14 NOTE — ANESTHESIA CARE TRANSFER NOTE
Last vitals:   Vitals:    12/14/17 1115   BP:    Pulse: (P) 79   Resp: (P) 16   Temp: (P) 36.8  C (98.3  F)   SpO2: (P) 93%     Patient's level of consciousness is awake  Spontaneous respirations: yes  Maintains airway independently: yes  Dentition unchanged: yes  Oropharynx: oropharynx clear of all foreign objects    QCDR Measures:  ASA# 20 - Surgical Safety Checklist: WHO surgical safety checklist completed prior to induction  PQRS# 430 - Adult PONV Prevention: 4558F - Pt received => 2 anti-emetic agents (different classes) preop & intraop  ASA# 8 - Peds PONV Prevention: NA - Not pediatric patient, not GA or 2 or more risk factors NOT present  PQRS# 424 - Coretta-op Temp Management: 4559F - At least one body temp DOCUMENTED => 35.5C or 95.9F within required timeframe  PQRS# 426 - PACU Transfer Protocol: - Transfer of care checklist used  ASA# 14 - Acute Post-op Pain: ASA14B - Patient did NOT experience pain >= 7 out of 10

## 2021-06-16 PROBLEM — M25.561 RIGHT KNEE PAIN: Status: ACTIVE | Noted: 2019-08-01

## 2021-06-16 PROBLEM — Z71.89 ADVANCED CARE PLANNING/COUNSELING DISCUSSION: Status: ACTIVE | Noted: 2019-09-03

## 2021-06-16 PROBLEM — R53.81 PHYSICAL DECONDITIONING: Status: ACTIVE | Noted: 2019-09-03

## 2021-06-16 PROBLEM — T84.50XA PROSTHETIC JOINT INFECTION, INITIAL ENCOUNTER (H): Status: ACTIVE | Noted: 2019-08-23

## 2021-06-16 PROBLEM — K21.9 GASTROESOPHAGEAL REFLUX DISEASE WITHOUT ESOPHAGITIS: Status: ACTIVE | Noted: 2019-08-02

## 2021-06-16 NOTE — ANESTHESIA PROCEDURE NOTES
Spinal Block    Patient location during procedure: OR  Start time: 4/6/2021 11:30 AM  End time: 4/6/2021 11:34 AM  Reason for block: primary anesthetic    Staffing:  Performing  Anesthesiologist: Cuco Hamlin MD    Preanesthetic Checklist  Completed: patient identified, risks, benefits, and alternatives discussed, timeout performed, consent obtained, airway assessed, oxygen available, suction available, emergency drugs available and hand hygiene performed  Spinal Block  Patient position: sitting  Prep: ChloraPrep  Patient monitoring: continuous pulse ox  Approach: midline  Location: L3-4  Injection technique: single-shot  Needle type: pencil-tip   Needle gauge: 24 G

## 2021-06-16 NOTE — ANESTHESIA CARE TRANSFER NOTE
Last vitals:   Vitals:    04/06/21 1335   BP: 105/57   Pulse: 84   Resp: 12   Temp: 36.2  C (97.1  F)   SpO2: 94%     Patient's level of consciousness is drowsy  Spontaneous respirations: yes  Maintains airway independently: yes  Dentition unchanged: yes  Oropharynx: oropharynx clear of all foreign objects    QCDR Measures:  ASA# 20 - Surgical Safety Checklist: WHO surgical safety checklist completed prior to induction    PQRS# 430 - Adult PONV Prevention: 4558F - Pt received => 2 anti-emetic agents (different classes) preop & intraop  ASA# 8 - Peds PONV Prevention: NA - Not pediatric patient, not GA or 2 or more risk factors NOT present  PQRS# 424 - Coretta-op Temp Management: 4559F - At least one body temp DOCUMENTED => 35.5C or 95.9F within required timeframe  PQRS# 426 - PACU Transfer Protocol: - Transfer of care checklist used  ASA# 14 - Acute Post-op Pain: ASA14B - Patient did NOT experience pain >= 7 out of 10

## 2021-06-16 NOTE — ANESTHESIA PREPROCEDURE EVALUATION
Anesthesia Evaluation        Airway   Mallampati: I   Pulmonary - normal exam   (+) sleep apnea on CPAP, ,                          Cardiovascular - negative ROS and normal exam   Neuro/Psych    (+) depression,     Endo/Other - negative ROS      GI/Hepatic/Renal - negative ROS           Dental    (+) poor dentition                       Anesthesia Plan  Planned anesthetic: spinal    ASA 2     Anesthetic plan and risks discussed with: patient    Post-op plan: routine recovery

## 2021-06-16 NOTE — ANESTHESIA POSTPROCEDURE EVALUATION
Patient: Leena Glover  Procedure(s):  LEFT TOTAL HIP REVISION (Left)  Anesthesia type: spinal    Patient location: PACU  Last vitals:   Vitals Value Taken Time   /63 04/06/21 1450   Temp 36.4  C (97.5  F) 04/06/21 1450   Pulse 58 04/06/21 1450   Resp 16 04/06/21 1450   SpO2 94 % 04/06/21 1450     Post vital signs: stable  Level of consciousness: awake and responds to simple questions  Post-anesthesia pain: pain controlled  Post-anesthesia nausea and vomiting: no  Pulmonary: return to baseline, spontaneous ventilation, nasal cannula  Cardiovascular: stable, blood pressure at baseline and bradycardic  Hydration: adequate  Anesthetic events: no    QCDR Measures:  ASA# 11 - Coretta-op Cardiac Arrest: ASA11B - Patient did NOT experience unanticipated cardiac arrest  ASA# 12 - Coretta-op Mortality Rate: ASA12B - Patient did NOT die  ASA# 13 - PACU Re-Intubation Rate: NA - No ETT / LMA used for case  ASA# 10 - Composite Anes Safety: ASA10A - No serious adverse event    Additional Notes: doing well, no complaints

## 2021-06-19 NOTE — LETTER
Letter by Lily Sanford CNP at      Author: Lily Sanford CNP Service: -- Author Type: --    Filed:  Encounter Date: 2019 Status: (Other)         Patient: Leena Glover   MR Number: [id number]   YOB: 1950   Date of Visit: 2019       [facility]      NAME:  Leena Glover             :  1950    MRN: [id number]    CODE STATUS:  FULL CODE    FACILITY: [facility] [[id number]]       CHIEF COMPLAIN/REASON FOR VISIT:  Chief Complaint   Patient presents with   ? Review Of Multiple Medical Conditions       HISTORY OF PRESENT ILLNESS: Leena Glover is a 68 y.o. male being seen today for a review of multiple medical conditions. Pt was at [facility] for an infection of his recently replaced right knee joint. He was initially hospitalized from 2019 to 8/3/2019 and was discharged home after a successful surgery. He was then readmitted with infection on 2019 and discharged to TCU on 2019  He has a past medical history of Acid reflux, ADD (attention deficit disorder), Anxiety, Arthritis, Cancer (H), Depression, Hyperlipidemia, Moderate asthma without complication, unspecified whether persistent, and Sleep apnea. He also has no past medical history of Family history of malignant hyperthermia, History of anesthesia complications, History of blood clots, History of transfusion, PONV (postoperative nausea and vomiting), Seizures (H), or Stroke (H).  We discussed IVAB training for family today so he could go home.     Allergies   Allergen Reactions   ? Prilosec [Omeprazole] Diarrhea   :     Current Outpatient Medications   Medication Sig   ? acetaminophen (TYLENOL) 500 MG tablet Take 500-1,000 mg by mouth every 6 (six) hours as needed for pain.   ? ascorbic acid (VITAMIN C) 500 MG tablet Take 500 mg by mouth at bedtime.          ? aspirin 325 MG tablet Take 1 tablet (325 mg total) by mouth 2 (two) times a day with meals.   ?  atorvastatin (LIPITOR) 40 MG tablet Take 20 mg by mouth bedtime.   ? buPROPion (WELLBUTRIN XL) 150 MG 24 hr tablet Take 450 mg by mouth daily.   ? ceFAZolin in D5W (ANCEF) 2 gram/100 mL PgBk Infuse 100 mL (2 g total) into a venous catheter every 8 (eight) hours.   ? doxazosin (CARDURA) 4 MG tablet Take 4 mg by mouth at bedtime.   ? esomeprazole (NEXIUM) 40 MG capsule Take 40 mg by mouth 2 (two) times a day.   ? ferrous sulfate 325 (65 FE) MG tablet Take 1 tablet (325 mg total) by mouth daily with breakfast.   ? hydrOXYzine HCl (ATARAX) 25 MG tablet Take 1 tablet (25 mg total) by mouth every 4 (four) hours as needed (pain, muscle spasms, itching, anxiety).   ? levomilnacipran (FETZIMA) 80 mg Cs24 ER capsule Take 120 mg by mouth daily.          ? methylphenidate HCl (RITALIN) 10 MG tablet Take 2 tablets (20 mg total) by mouth every morning.   ? methylphenidate HCl (RITALIN) 10 MG tablet Take 1 tablet (10 mg total) by mouth daily with lunch. At noon   ? oxyCODONE (ROXICODONE) 5 MG immediate release tablet Take 5 mg by mouth every 4 (four) hours as needed for pain.   ? oxyCODONE (ROXICODONE) 5 MG immediate release tablet 10 mg PO three times a day and 5 mg at bedtime, and 1 tablet every 4 hours as needed for pain.   ? psyllium (METAMUCIL) 0.52 gram capsule Take 1.04 g by mouth 2 (two) times a day.   ? rifAMPin (RIMACTANE) 150 MG capsule Take 3 capsules (450 mg total) by mouth 2 (two) times a day.   ? senna-docusate (PERICOLACE) 8.6-50 mg tablet Take 1 tablet by mouth 2 (two) times a day as needed for constipation.         REVIEW OF SYSTEMS:    Currently, no fever, chills, or rigors. Does not have any visual or hearing problems. Denies any chest pain, headaches, palpitations, lightheadedness, dizziness, shortness of breath, or cough. Appetite is good. Denies any GERD symptoms. Denies any difficulty with swallowing, nausea, or vomiting.  Denies any abdominal pain, diarrhea or constipation. Denies any urinary symptoms. No  insomnia. No active bleeding. No rash.       PHYSICAL EXAMINATION:  Vitals:    09/11/19 2032   BP: 119/59   Pulse: 88   Temp: 97.2  F (36.2  C)   Weight: 196 lb 12.8 oz (89.3 kg)         GENERAL: Awake, Alert, oriented x3, not in any form of acute distress, answers questions appropriately, follows simple commands, conversant  HEENT: Head is normocephalic with normal hair distribution. No evidence of trauma. Ears: No acute purulent discharge. Eyes: Conjunctivae pink with no scleral jaundice. Nose: Normal mucosa and septum. NECK: Supple with no cervical or supraclavicular lymphadenopathy. Trachea is midline.   CHEST: No tenderness or deformity, no crepitus  LUNG: Clear to auscultation with good chest expansion. There are no crackles or wheezes, normal AP diameter.  BACK: No kyphosis of the thoracic spine. Symmetric, no curvature, ROM normal, no CVA tenderness, no spinal tenderness   CVS: There is good S1  S2, there are no murmurs, rubs, gallops, or heaves, rhythm is regular.  ABDOMEN: Globular and soft, nontender to palpation, non distended, no masses, no organomegaly, good bowel sounds, no rebound or guarding, no peritoneal signs.   EXTREMITIES: Atraumatic. Full range of motion on both upper and lower extremities, there is no tenderness to palpation, no pedal edema, no cyanosis or clubbing, no calf tenderness, normal cap refill, no joint swelling.  SKIN: Warm and dry, no erythema noted, no rashes or lesions.  NEUROLOGICAL: The patient is oriented to person, place and time. Strength and sensation are grossly intact. Face is symmetric.                    LABS:    Lab Results   Component Value Date    WBC 7.3 09/10/2019    HGB 10.1 (L) 09/10/2019    HCT 33.2 (L) 09/10/2019    MCV 94 09/10/2019     09/10/2019       Results for orders placed or performed during the hospital encounter of 08/23/19   Basic Metabolic Panel   Result Value Ref Range    Sodium 136 136 - 145 mmol/L    Potassium 3.9 3.5 - 5.0 mmol/L     Chloride 105 98 - 107 mmol/L    CO2 25 22 - 31 mmol/L    Anion Gap, Calculation 6 5 - 18 mmol/L    Glucose 101 70 - 125 mg/dL    Calcium 8.4 (L) 8.5 - 10.5 mg/dL    BUN 17 8 - 22 mg/dL    Creatinine 0.82 0.70 - 1.30 mg/dL    GFR MDRD Af Amer >60 >60 mL/min/1.73m2    GFR MDRD Non Af Amer >60 >60 mL/min/1.73m2           No results found for: HGBA1C  No results found for: ENOFZWPD88EF  No results found for: RPSTKAIJ28    ASSESSMENT/PLAN:  1. Prosthetic joint infection, subsequent  encounter (H)    2. Physical deconditioning      1. Prosthetic joint infection: Ancef 2 gmsq 8 hrs x 2 more weeks. DC planning today for family to assist with IV training to complete regime at home. He is to complete therapy with PT this week and possible dc home Friday. Knee remains swollen, sutures in place. PICC in place with CDI dressing    2. Physical decondition: Continue rehab services , reports he feel he has improved enough for dc by end of week. He will continue on IVAB at home.      Electronically signed by:  Lily Sanford CNP  This progress note was completed using Dragon software and there may be grammatical errors.

## 2021-06-19 NOTE — LETTER
"Letter by Vidya Ibanez CNP at      Author: Vidya Ibanez CNP Service: -- Author Type: --    Filed:  Encounter Date: 9/6/2019 Status: (Other)         Patient: Leena Glover   MR Number: [id number]   YOB: 1950   Date of Visit: 9/6/2019     [facility]    Facility:   [facility] [[id number]]   Code Status: FULL CODE and POLST AVAILABLE      CHIEF COMPLAINT/REASON FOR VISIT:  Chief Complaint   Patient presents with   ? Review Of Multiple Medical Conditions     physical deconditioning, uncontrolled pain, right knee pain, right knee infection s/p RTKA       HISTORY:      HPI: Leena is a 68 y.o. male who  has a past medical history of Acid reflux, ADD (attention deficit disorder), Anxiety, Arthritis, Cancer (H), Depression, Hyperlipidemia, Moderate asthma without complication, unspecified whether persistent, and Sleep apnea. He also has no past medical history of Family history of malignant hyperthermia, History of anesthesia complications, History of blood clots, History of transfusion, PONV (postoperative nausea and vomiting), Seizures (H), or Stroke (H). Leena was recently hospitalized at [facility] for an infection of his recently replaced right knee joint. He was initially hospitalized from 8/1/2019 to 8/3/2019 and was discharged home after a successful surgery. He was then readmitted with infection on 8/23/2019 and discharged to TCU on 8/29/2019. The discharging provider summarized the hospitalization as follows:     \"68 y.o. male with past medical history of sleep apnea CPAP anxiety ADHD, GERD, recent right total knee arthroplasty on 8/1/2019 who was admitted on 8/23/2019 for right prosthetic knee joint infection directly admitted from [facility].He had I&D poly-exchange. surgical cultures staph aureus, Streptococcus agalactiae, MSSA bacteremia.  Follow-up Blood cultures from 8/25 so far negative. MARCIA 8/27 no vegetations.  He is discharging " "on Ancef for 6 weeks, rifampin via PICC line.  He will have weekly labs and faxed to infectious disease.  He is discharged to transitional care unit.  He had acute blood loss anemia and chronic anemia and started on iron.  On admission he did have a urticarial rash exact trigger is unclear.  Resolved with Solu-Medrol, Benadryl.  Rifampin may significantly reduce the effectiveness of oxycodone, bupropion.  Pain medications may need to be adjusted.\"    Today Live is being evaluated for a routine review of medical problems while in TCU. He continues to share that he has uncontrolled pain at times. He is requesting oxycodone refill and does not believe that he can start to taper off of oxycodone. He feels it is still needed. Did talk about alternative therapies such as ice, massage, compression. Live states other than that he is doing well. Therapies have been going well. He does feel stronger and more balanced. He has been eating, drinking and eliminating well. He denies any other concerns including fevers/chills, cough or cold symptoms, headaches, vision changes, chest pain/pressure, difficulty breathing, SOB, abdominal pain, nausea, vomiting, diarrhea, dysuria, increasing weakness.     Past Medical History:   Diagnosis Date   ? Acid reflux    ? ADD (attention deficit disorder)    ? Anxiety    ? Arthritis    ? Cancer (H)     prostate   ? Depression    ? Hyperlipidemia    ? Moderate asthma without complication, unspecified whether persistent    ? Sleep apnea              Family History   Problem Relation Age of Onset   ? Heart disease Mother    ? Prostate cancer Father    ? Stroke Father      Social History     Socioeconomic History   ? Marital status:      Spouse name: None   ? Number of children: None   ? Years of education: None   ? Highest education level: None   Occupational History   ? None   Social Needs   ? Financial resource strain: None   ? Food insecurity:     Worry: None     Inability: None   ? " Transportation needs:     Medical: None     Non-medical: None   Tobacco Use   ? Smoking status: Never Smoker   ? Smokeless tobacco: Never Used   Substance and Sexual Activity   ? 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   ? Drug use: No   ? Sexual activity: None   Lifestyle   ? Physical activity:     Days per week: None     Minutes per session: None   ? Stress: None   Relationships   ? Social connections:     Talks on phone: None     Gets together: None     Attends Nondenominational service: None     Active member of club or organization: None     Attends meetings of clubs or organizations: None     Relationship status: None   ? Intimate partner violence:     Fear of current or ex partner: None     Emotionally abused: None     Physically abused: None     Forced sexual activity: None   Other Topics Concern   ? None   Social History Narrative   ? None       REVIEW OF SYSTEM:  Pertinent items are noted in HPI.    PHYSICAL EXAM:   /59   Pulse 88   Temp 97.2  F (36.2  C)   Resp 18   Wt 196 lb 12.8 oz (89.3 kg)   SpO2 96%   BMI 25.96 kg/m     General appearance: alert, appears stated age and cooperative  HEENT: Head is normocephalic with normal hair distribution. No evidence of trauma. Ears: Without lesions or deformity. No acute purulent discharge. Eyes: Conjunctivae pink with no scleral icterus or erythema. Nose: Normal mucosa and septum. Oropharnyx: mmm, no lesions present.  Lungs: clear to auscultation bilaterally, respirations without effort  Heart: regular rate and rhythm, S1, S2 normal, no murmur, click, rub or gallop  Abdomen: soft, non-tender; bowel sounds normal; no masses,  no organomegaly  Extremities: extremities normal, right knee swollen not pitting.   Pulses: 2+ and symmetric  Skin: Skin color, texture, turgor normal. No rashes or lesions. Incision to right knee is bandaged-clean, dry and intact.   Neurologic: Grossly normal   Psych: interacts well with caregivers, exhibits logical thought  processes and connections, pleasant      LABS:   None today.     ASSESSMENT:      ICD-10-CM    1. Chronic pain of right knee M25.561     G89.29    2. S/P knee surgery Z98.890    3. Physical deconditioning R53.81    4. Prosthetic joint infection, initial encounter (H) T84.50XA        PLAN:    Physical Deconditioning  -Continue PT/OT and other therapies as per care plan.  -Encouraged good nutrition and movement habits.   -Discussed care plan and expected course of stay.   -Continue to follow-up per routine schedule or sooner if needed.     S/p Right Knee Replacement/Right Knee Surgery  -Tylenol 1000 mg by mouth every 6 hours.   - mg by mouth two times a day.   -Hydroxyzine 25 mg by mouth with pain medications.   -Ferrous Sulfate 325 mg by mouth daily, plan to change to three times weekly.   -Oxycodone 10 mg by mouth three times a day, 5 mg at bedtime and may have 5 mg by mouth every 4 hours as needed.      Prosthetic Joint Infection  -Cefazolin 2 g IV three times a day.   -Rifampin 450 mg by mouth two times a day.   -Follow with ID and with ortho.     UNCONTROLLED Pain--Right Knee Pain  -Right knee pain is severe due to prosthetic joint infection and concurrent use of rifampin and oxycodone.   --Oxycodone 10 mg by mouth three times a day, 5 mg at bedtime and may have 5 mg by mouth every 4 hours as needed.    -Ice knee as frequently as possible for 20 minute increments.   -PT/OT for pain.   -Massage for fluid absorption.   -Compression to help with fluid absorption.     Otherwise continue current care plan for all other chronic medical conditions, as they are stable. Encouraged patient to engage in healthy lifestyle behaviors such as engaging in social activities, exercising (PT/OT), eating well, and following care plan. Follow up for routine check-up, or sooner if needed. Will continue to monitor patient and work with nursing staff collaboratively to work toward positive patient outcomes.    Electronically  signed by: Vidya Ibanez, CNP

## 2021-06-19 NOTE — LETTER
Letter by Rosa Metzger MD at      Author: Rosa Metzger MD Service: -- Author Type: --    Filed:  Encounter Date: 9/9/2019 Status: (Other)         Patient: Leena Glover   MR Number: [id number]   YOB: 1950   Date of Visit: 9/9/2019     [facility]      Facility:    [facility] [[id number]]    Code Status: FULL CODE   [facility]   8/1 -  8/3/2019: Right total knee arthroplasty  [facility]   8/23-8/29 /19:   Right TKA  infection      Chief Complaint/Reason for Visit:  Chief Complaint   Patient presents with   ? Review Of Multiple Medical Conditions     R TKA / infection washout       HPI:   Leena is a 68 y.o. male medical history of ADD, anxiety/depression, alcoholism in remission, GERD, obstructive sleep apnea on CPAP.  Who has a history of osteoarthritis, with a previous left total hip, left and right patella , meniscus tear,.    He had a conversion of the right patellofemoral joint to a right total knee arthroplasty with Dr. Polanco on 8/1/2019.    He developed red knee, draining from the incision.  He was seen at [facility] on 8/23, and was sent directly to the hospital.  He was admitted,  Dr Mei performed incision and drainage, poly-exchange secondary to Streptococcus agalactiae, MSSA.  He did have MSSA bacteremia., HLD  Ancef IV was planned for 6 weeks, with oral rifampin.    Transesophageal echo: No vegetations, normal ejection fraction equals 60%    Acute blood loss anemia, was started on iron orally.  NOTE: Rifampin could cause decrease in effectiveness of Oxycodone and bupropion.    He transferred to [facility] for therapy.    UPDATE:  Completely independent, green tag.  He is here because he has IV antibiotics.  Nurses note erythema at the site of the PICC line      Past Medical History:  Past Medical History:   Diagnosis Date   ? Acid reflux    ? ADD (attention deficit disorder)    ? Anxiety    ? Arthritis    ?  Cancer (H)     prostate   ? Depression    ? Hyperlipidemia    ? Moderate asthma without complication, unspecified whether persistent    ? Sleep apnea            Surgical History:  Past Surgical History:   Procedure Laterality Date   ? APPENDECTOMY     ? CATARACT EXTRACTION     ? JOINT REPLACEMENT Right 2012    knee cap replaced   ? JOINT REPLACEMENT Left 2007    KRISTEN, socket replaced   ? PICC AND MIDLINE TEAM LINE INSERTION  8/28/2019        ? KY EXPLOR/DRAIN KNEE,INFECTN Right 8/23/2019    Procedure: INCISION AND DRAINAGE/DEBRIDMENT OF RIGHT TOTAL KNEE REPLACMENT, EXCHANGE POLY;  Surgeon: Konstantin Mei MD;  Location: [facility];  Service: Orthopedics   ? KY TOTAL KNEE ARTHROPLASTY Right 8/1/2019    Procedure: TO TOTAL KNEE ARTHROPLASTY;  Surgeon: Chris Polanco MD;  Location: [facility] OR;  Service: Orthopedics           Review of Systems   Using CPM machine: 95 degrees    She would like to be taught how to give his IV antibiotics himself and go home.  Nurse manager of TCU 3 is checking on the costs/coverage.      Vitals:    09/09/19 0800   BP: 130/63   Pulse: 88   Resp: 16   Temp: 96.9  F (36.1  C)   SpO2: 98%       Physical Exam    Constitutional: He is pleasant, ambulatory  Cardiovascular: Regular , normal heart sounds, no murmur   Pulmonary/Chest: Breath sounds clear  Abdomen: soft, + BS   Musculoskeletal: PICC line in his right upper medial arm with erythematous skin at the insertion site, no pus  R knee incision well healed, mildly edematous and warm but decreased from last week.  Neurological: He is alert and oriented to person, place, and time. He exhibits normal muscle tone. Coordination normal.   Skin: Skin is warm and dry. No rash noted.   Bites his fingernails short   Psychiatric: He has a normal mood,thought process      Allergies   Allergen Reactions   ? Prilosec [Omeprazole] Diarrhea       Medication List:  Current Outpatient Medications   Medication Sig   ? acetaminophen (TYLENOL)  500 MG tablet Take 500-1,000 mg by mouth every 6 (six) hours as needed for pain.   ? ascorbic acid (VITAMIN C) 500 MG tablet Take 500 mg by mouth at bedtime.          ? aspirin 325 MG tablet Take 1 tablet (325 mg total) by mouth 2 (two) times a day with meals.   ? atorvastatin (LIPITOR) 40 MG tablet Take 20 mg by mouth bedtime.   ? buPROPion (WELLBUTRIN XL) 150 MG 24 hr tablet Take 450 mg by mouth daily.   ? ceFAZolin in D5W (ANCEF) 2 gram/100 mL PgBk Infuse 100 mL (2 g total) into a venous catheter every 8 (eight) hours.   ? doxazosin (CARDURA) 4 MG tablet Take 4 mg by mouth at bedtime.   ? esomeprazole (NEXIUM) 40 MG capsule Take 40 mg by mouth 2 (two) times a day.   ? ferrous sulfate 325 (65 FE) MG tablet Take 1 tablet (325 mg total) by mouth daily with breakfast.   ? hydrOXYzine HCl (ATARAX) 25 MG tablet Take 1 tablet (25 mg total) by mouth every 4 (four) hours as needed (pain, muscle spasms, itching, anxiety).   ? levomilnacipran (FETZIMA) 80 mg Cs24 ER capsule Take 120 mg by mouth daily.          ? methylphenidate HCl (RITALIN) 10 MG tablet Take 2 tablets (20 mg total) by mouth every morning.   ? methylphenidate HCl (RITALIN) 10 MG tablet Take 1 tablet (10 mg total) by mouth daily with lunch. At noon   ? oxyCODONE (ROXICODONE) 5 MG immediate release tablet Take 1-2 tablets (5-10 mg total) by mouth every 4 (four) hours as needed. Take 1 tab for pain 1-6/10, take 2 tabs for pain 7-10/10. (Patient taking differently: Take 10 mg by mouth every 4 (four) hours. Again: Rifampin decreases effectiveness of Oxycodone      )   ? oxyCODONE (ROXICODONE) 5 MG immediate release tablet Take 5 mg by mouth every 4 (four) hours as needed for pain.   ? psyllium (METAMUCIL) 0.52 gram capsule Take 1.04 g by mouth 2 (two) times a day.   ? rifAMPin (RIMACTANE) 150 MG capsule Take 3 capsules (450 mg total) by mouth 2 (two) times a day.   ? senna-docusate (PERICOLACE) 8.6-50 mg tablet Take 1 tablet by mouth 2 (two) times a day as  needed for constipation.       Labs:      Ref Range & Units 9/3/19 0609    WBC 4.0 - 11.0 thou/uL 10.1     Hemoglobin 14.0 - 18.0 g/dL 9.2Low      Platelets 140 - 440 thou/uL 372     MPV 8.5 - 12.5 fL 8.1Low      Neutrophils % 50 - 70 % 72High        Ref Range & Units 9/3/19 0609 8/23/19 1738 8/22/19 1019     CRP 0.0 - 0.8 mg/dL 4.9High   41.3High   27.5High         Ref Range & Units 9/3/19    Creatinine 0.70 - 1.30 mg/dL 0.83     GFR MDRD Non Af Amer >60 mL/min/1.73m2 >60          Ref Range & Units 9/3/19  8/23/19     Bilirubin, Total 0.0 - 1.0 mg/dL 0.2  0.4     Bilirubin, Direct <=0.5 mg/dL 0.1      Protein, Total 6.0 - 8.0 g/dL 5.8Low   6.1     Albumin 3.5 - 5.0 g/dL 2.5Low   2.7Low      Alkaline Phosphatase 45 - 120 U/L 95  85     AST 0 - 40 U/L 12  11     ALT 0 - 45 U/L <9  12              Ref Range & Units 8/28/19 0740 8/26/19 0420    Hemoglobin 14.0 - 18.0 g/dL 8.4Low   7.9Low         Ref Range & Units 8/23/19 1738 8/22/19 1019    CRP 0.0 - 0.8 mg/dL 41.3High   27.5       Ref Range & Units 8/23/19 1738 8/22/19 1019    Sed Rate 0 - 15 mm/hr 89High   36High              Ref Range & Units 8/25/19 0656 8/24/19 0612    Sodium 136 - 145 mmol/L 136  131Low      Potassium 3.5 - 5.0 mmol/L 3.9  4.2     Chloride 98 - 107 mmol/L 105  102     CO2 22 - 31 mmol/L 25  21Low      Anion Gap, Calculation 5 - 18 mmol/L 6  8     Glucose 70 - 125 mg/dL 101  108     Calcium 8.5 - 10.5 mg/dL 8.4Low   8.4Low      BUN 8 - 22 mg/dL 17  21     Creatinine 0.70 - 1.30 mg/dL 0.82  0.88     GFR MDRD Non Af Amer >60 mL/min/1.73m2 >60  >60         Ref Range & Units 8/25/19 0656 8/25/19 0613   8/24/19    WBC 4.0 - 11.0 thou/uL 6.9  10.1     Hemoglobin 14.0 - 18.0 g/dL 8.3Low   8.9Low      RDW 11.0 - 14.5 % 13.2  12.8     Platelets 140 - 440 thou/uL 228  212     Neutrophils % 50 - 70 % 73High   84High      Lymphocytes % 20 - 40 % 11Low   8Low         8/23/19  Bilirubin, Total 0.0 - 1.0 mg/dL 0.4    Calcium 8.5 - 10.5 mg/dL 8.9     Protein,  Total 6.0 - 8.0 g/dL 6.1     Albumin 3.5 - 5.0 g/dL 2.7Low      Alkaline Phosphatase 45 - 120 U/L 85     AST 0 - 40 U/L 11     ALT 0 - 45 U/L 12           Assessment / Plan:    ICD-10-CM    1. Infection of prosthetic joint, subsequent encounter: R TKA T84.50XD Cefazolin 6 weeks total, with Rifampin.  Will have PICC line changed, especially with new hardware in right knee   2. Post-op pain G89.18  Oxycodone with lower  10 mg during daytime hours scheduled, 5 mg HS, and continued prn   3. Depression, unspecified depression type F32.9 Bupropion effectiveness may be decreased as well by the Rifampin. Also on Fetzima   4. Attention deficit hyperactivity disorder (ADHD), unspecified ADHD type F90.9 On Ritalin   5. Gastroesophageal reflux disease without esophagitis K21.9 Nexium   6. Sleep apnea, unspecified type G47.30 Has home  CPAP with him           Electronically signed by: Rosa Metzger MD

## 2021-06-19 NOTE — LETTER
"Letter by Vidya Ibanez CNP at      Author: Vidya Ibanez CNP Service: -- Author Type: --    Filed:  Encounter Date: 8/30/2019 Status: (Other)         Patient: Leena Glover   MR Number: [id number]   YOB: 1950   Date of Visit: 8/30/2019     [facility]    Facility:   [facility] [[id number]]   Code Status: FULL CODE and POLST AVAILABLE      CHIEF COMPLAINT/REASON FOR VISIT:  Chief Complaint   Patient presents with   ? Review Of Multiple Medical Conditions     TCU intake       HISTORY:      HPI: Leena is a 68 y.o. male who  has a past medical history of Acid reflux, ADD (attention deficit disorder), Anxiety, Arthritis, Cancer (H), Depression, Hyperlipidemia, Moderate asthma without complication, unspecified whether persistent, and Sleep apnea. He also has no past medical history of Family history of malignant hyperthermia, History of anesthesia complications, History of blood clots, History of transfusion, PONV (postoperative nausea and vomiting), Seizures (H), or Stroke (H). Leena was recently hospitalized at [facility] for an infection of his recently replaced right knee joint. He was initially hospitalized from 8/1/2019 to 8/3/2019 and was discharged home after a successful surgery. He was then readmitted with infection on 8/23/2019 and discharged to TCU on 8/29/2019. The discharging provider summarized the hospitalization as follows:     \"68 y.o. male with past medical history of sleep apnea CPAP anxiety ADHD, GERD, recent right total knee arthroplasty on 8/1/2019 who was admitted on 8/23/2019 for right prosthetic knee joint infection directly admitted from [facility].He had I&D poly-exchange. surgical cultures staph aureus, Streptococcus agalactiae, MSSA bacteremia.  Follow-up Blood cultures from 8/25 so far negative. MARCIA 8/27 no vegetations.  He is discharging on Ancef for 6 weeks, rifampin via PICC line.  He will have weekly labs and " "faxed to infectious disease.  He is discharged to transitional care unit.  He had acute blood loss anemia and chronic anemia and started on iron.  On admission he did have a urticarial rash exact trigger is unclear.  Resolved with Solu-Medrol, Benadryl.  Rifampin may significantly reduce the effectiveness of oxycodone, bupropion.  Pain medications may need to be adjusted.\"    Today Live is being evaluated for an intake to the TCU. Live states that he has been relatively independent and has been working hard to regain strength. He feels he is getting stronger and that he really would like to go home soon. Live has been eating, drinking and eliminating well. He shares that he does have one issue and that is pain management. He was on oxycodone scheduled regularly, however it was changed to as needed upon hospital discharge. He isn't sure why it was changed and is having significant pain. He shares that pain was much better managed when it was scheduled. Discharge planning did note that pain medications may need to be adjusted due to effectiveness of oxycodone when mixed with rifampin. Will schedule accordingly and will attempt to wean from pain medication. Otherwise no concerns. He denies any other concerns including fevers/chills, cough or cold symptoms, headaches, vision changes, chest pain/pressure, difficulty breathing, SOB, abdominal pain, nausea, vomiting, diarrhea, dysuria, increasing weakness, increasing pain--pain has been constant.     Advanced Care Planning  Spoke with Leena regarding code status and advanced care planning. Discussed that he would like to have full resuscitation efforts. He would also like to have treatment if he were to fall ill. He would like full medical treatment for all medical issues and family would decide for him if he was unable to decide. He shares that his wife Shannan would be the person to make the decision if he was ill.     Past Medical History:   Diagnosis Date   ? Acid reflux  "   ? ADD (attention deficit disorder)    ? Anxiety    ? Arthritis    ? Cancer (H)     prostate   ? Depression    ? Hyperlipidemia    ? Moderate asthma without complication, unspecified whether persistent    ? Sleep apnea              Family History   Problem Relation Age of Onset   ? Heart disease Mother    ? Prostate cancer Father    ? Stroke Father      Social History     Socioeconomic History   ? Marital status:      Spouse name: None   ? Number of children: None   ? Years of education: None   ? Highest education level: None   Occupational History   ? None   Social Needs   ? Financial resource strain: None   ? Food insecurity:     Worry: None     Inability: None   ? Transportation needs:     Medical: None     Non-medical: None   Tobacco Use   ? Smoking status: Never Smoker   ? Smokeless tobacco: Never Used   Substance and Sexual Activity   ? 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   ? Drug use: No   ? Sexual activity: None   Lifestyle   ? Physical activity:     Days per week: None     Minutes per session: None   ? Stress: None   Relationships   ? Social connections:     Talks on phone: None     Gets together: None     Attends Adventism service: None     Active member of club or organization: None     Attends meetings of clubs or organizations: None     Relationship status: None   ? Intimate partner violence:     Fear of current or ex partner: None     Emotionally abused: None     Physically abused: None     Forced sexual activity: None   Other Topics Concern   ? None   Social History Narrative   ? None       REVIEW OF SYSTEM:  Pertinent items are noted in HPI.    PHYSICAL EXAM:   /70   Pulse (!) 105   Temp 98.7  F (37.1  C)   Resp 18   SpO2 96%   General appearance: alert, appears stated age and cooperative  HEENT: Head is normocephalic with normal hair distribution. No evidence of trauma. Ears: Without lesions or deformity. No acute purulent discharge. Eyes: Conjunctivae pink with no  scleral icterus or erythema. Nose: Normal mucosa and septum. Oropharnyx: mmm, no lesions present.  Lungs: clear to auscultation bilaterally, respirations without effort  Heart: regular rate and rhythm, S1, S2 normal, no murmur, click, rub or gallop  Abdomen: soft, non-tender; bowel sounds normal; no masses,  no organomegaly  Extremities: extremities normal, atraumatic, no cyanosis or edema  Pulses: 2+ and symmetric  Skin: Skin color, texture, turgor normal. No rashes or lesions. Incision to right knee is bandaged-clean, dry and intact.   Neurologic: Grossly normal   Psych: interacts well with caregivers, exhibits logical thought processes and connections, pleasant      LABS:   None today.     ASSESSMENT:      ICD-10-CM    1. Prosthetic joint infection, initial encounter (H) T84.50XA    2. Physical deconditioning R53.81    3. S/P knee surgery Z98.890    4. Advanced care planning/counseling discussion Z71.89        PLAN:    Physical Deconditioning  -Continue PT/OT and other therapies as per care plan.  -Encouraged good nutrition and movement habits.   -Discussed care plan and expected course of stay.   -Continue to follow-up per routine schedule or sooner if needed.     S/p Right Knee Replacement/Right Knee Surgery  -Tylenol 1000 mg by mouth every 6 hours.   - mg by mouth two times a day.   -Hydroxyzine 25 mg by mouth with pain medications.   -Ferrous Sulfate 325 mg by mouth daily, plan to change to three times weekly.   -Change Oxycodone to 10 mg by mouth every 4 hours scheduled through Tuesday when re-evaluated by Dr. Metzger.     Prosthetic Joint Infection  -Cefazolin 2 g IV three times a day.   -Rifampin 450 mg by mouth two times a day.   -Follow with ID and with ortho.     Advanced Care Planning  -POLST reviewed and signed.   -Full Code.     Admission history and physical per MD in the next 30 days. At this time continue current care plan for all chronic medical conditions, as they are stable. Encouraged  patient to engage in PT/OT for strengthening and conditioning. Encouraged patient to work closely with nursing staff to ensure any medical complaints are quickly addressed. Follow up this week or sooner if needed. Will continue to monitor patient and work with nursing staff collaboratively to work toward positive patient outcomes.    Total unit/floor time of 35 minutes time consisted of the following: time spent with patient, examination of patient, reviewing the record including pertinent labs and completing documentation. More than 50% of this time was spent in coordination of care time with nursing staff and other healthcare providers, this time was spent on discussion/counseling on current care plan including medical management of chronic health problems and acute health problems, education pertaining to plan, and discussion of the goals of care pertaining to the current outlined plan with nursing staff and patient. An additional 16 minutes of time was spent discussing code status, wishes for end of life care and reviewing POLST from 1215 to 1231. POLST signed and left with nursing staff.     Electronically signed by: Vidya Ibanez CNP

## 2021-06-19 NOTE — LETTER
Letter by Rosa Metzger MD at      Author: Rosa Metzger MD Service: -- Author Type: --    Filed:  Encounter Date: 9/2/2019 Status: (Other)         Patient: Leena Glover   MR Number: [id number]   YOB: 1950   Date of Visit: 9/2/2019     [facility]      Facility:    [facility] [[id number]]    Code Status: FULL CODE   [facility]   8/1 -  8/3/2019: Right total knee arthroplasty  [facility]   8/23-8/29 /19:   Right TKA  infection      Chief Complaint/Reason for Visit:  Chief Complaint   Patient presents with   ? H & P     Infected right total knee arthroplasty       HPI:   Leena is a 68 y.o. male medical history of ADD, anxiety/depression, alcoholism in remission, GERD, obstructive sleep apnea on CPAP.  Who has a history of osteoarthritis, with a previous left total hip, left and right patella , meniscus tear,.    He had a conversion of the right patellofemoral joint to a right total knee arthroplasty with Dr. Polanco on 8/1/2019.    He developed red knee, draining from the incision.  He was seen at [facility] [place] on 8/23, and was sent directly to the hospital.  He was admitted,  Dr Mei performed incision and drainage, poly-exchange secondary to Streptococcus agalactiae, MSSA.  He did have MSSA bacteremia., HLD  Ancef IV was planned for 6 weeks, with oral rifampin.    Transesophageal echo: No vegetations, normal ejection fraction equals 60%    Acute blood loss anemia, was started on iron orally.  NOTE: Rifampin could cause decrease in effectiveness of Oxycodone and buprorion.      Past Medical History:  Past Medical History:   Diagnosis Date   ? Acid reflux    ? ADD (attention deficit disorder)    ? Anxiety    ? Arthritis    ? Cancer (H)     prostate   ? Depression    ? Hyperlipidemia    ? Moderate asthma without complication, unspecified whether persistent    ? Sleep apnea            Surgical History:  Past Surgical History:   Procedure  Laterality Date   ? APPENDECTOMY     ? CATARACT EXTRACTION     ? JOINT REPLACEMENT Right 2012    knee cap replaced   ? JOINT REPLACEMENT Left 2007    KRISTEN, socket replaced   ? PICC AND MIDLINE TEAM LINE INSERTION  8/28/2019        ? OH EXPLOR/DRAIN KNEE,INFECTN Right 8/23/2019    Procedure: INCISION AND DRAINAGE/DEBRIDMENT OF RIGHT TOTAL KNEE REPLACMENT, EXCHANGE POLY;  Surgeon: Konstantin Mei MD;  Location: [facility] OR;  Service: Orthopedics   ? OH TOTAL KNEE ARTHROPLASTY Right 8/1/2019    Procedure: TO TOTAL KNEE ARTHROPLASTY;  Surgeon: Chris Polanco MD;  Location: [facility] OR;  Service: Orthopedics       Family History:   Family History   Problem Relation Age of Onset   ? Heart disease Mother    ? Prostate cancer Father    ? Stroke Father      + Depression                                                Father and sister        + CHF/valve problems                                  Mother    + Breast cancer                                            Sister    + Diabetes                                                     Father      Social History     Socioeconomic History   ? Marital status:      Spouse name: Not on file   ? Number of children: Son and daughter   ? Years of education:    ? Highest education level: Graduate degree   Occupational History   ? 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 Financial resource strain: Not on file   ? Food insecurity:     Worry: Not on file     Inability: Not on file   ? Transportation needs:     Medical: Not on file     Non-medical: Not on file   Tobacco Use   ? Smoking status: Never Smoker   ? Smokeless tobacco: Never Used   Substance and Sexual Activity   ? Alcohol use: No: Alcoholic in remission     Comment: sober since 2010   ? Drug use: No   ? Sexual activity: Not on file   Lifestyle   ? Physical activity:     Days per week: Not on file     Minutes per session: Not on file   ? Stress: Not on file   Relationships   ? Social connections:     Talks  "on phone: Not on file     Gets together: Not on file     Attends Islam service: Not on file     Active member of club or organization: Not on file     Attends meetings of clubs or organizations: Not on file     Relationship status: Not on file   ? Intimate partner violence:     Fear of current or ex partner: Not on file     Emotionally abused: Not on file     Physically abused: Not on file     Forced sexual activity: Not on file       Review of Systems   CPM machine at the TCU is not working (flexes, does not extend)  Did not get his first dose of oxycodone at the TCU for over 12 hours due to delivery issue from the pharmacy  He was on oxycodone 10 mg 4 times a day, with \"decent\" pain coverage.  He was under the impression that Pao, NP, was going to continue that for a few days, but she decreased it to 5 mg scheduled with 5 mg as needed.however PRN is very delayed in being given.    He has had much higher levels of pain, therapy has been painful, he tried to walk independently as he has a green tag on his walker, but the pain was quite intense   Was constipated the whole time in the hospital, but has had large bowel movement  He is being awakened at 1 AM for a dose of cephalexin through his PICC line, he requested to be changed to a time that does not involve waking him up.  He expresses frustration and discouragement with his time in the TCU    The remainder of the comprehensive review of systems is negative      Also noted in the discharge summary, rifampin can decrease the effectiveness of oxycodone    Vitals:    09/01/19 2200   BP: 126/64   Pulse: 72   Resp: 16   Temp: 96.8  F (36  C)   SpO2: 95%       Physical Exam   Constitutional: He is oriented to person, place, and time. He appears well-developed and well-nourished. He appears distressed.   HENT:   Nose: Nose normal.   Dry oral membranes  Grey tooth discoloration   Eyes: Conjunctivae and EOM are normal. No scleral icterus.   Cardiovascular: Regular " rhythm and normal heart sounds.   No murmur heard.  Pulmonary/Chest: Breath sounds normal.   Musculoskeletal: He exhibits edema and tenderness. He exhibits no deformity.   Expected soft tissue swelling and warmth around his distal femur proximal tibial area.  Dressing from surgery with 1/2 cm diameter dried blood spot  PICC line in his right upper medial arm   Lymphadenopathy:     He has no cervical adenopathy.   Neurological: He is alert and oriented to person, place, and time. He exhibits normal muscle tone. Coordination normal.   Skin: Skin is warm and dry. No rash noted.   Bites his fingernails short   Psychiatric: He has a normal mood and affect. His behavior is normal. Thought content normal.         Allergies   Allergen Reactions   ? Prilosec [Omeprazole] Diarrhea       Medication List:  Current Outpatient Medications   Medication Sig   ? oxyCODONE (ROXICODONE) 5 MG immediate release tablet Take 5 mg by mouth every 4 (four) hours as needed for pain.   ? acetaminophen (TYLENOL) 500 MG tablet Take 500-1,000 mg by mouth every 6 (six) hours as needed for pain.   ? ascorbic acid (VITAMIN C) 500 MG tablet Take 500 mg by mouth at bedtime.          ? aspirin 325 MG tablet Take 1 tablet (325 mg total) by mouth 2 (two) times a day with meals.   ? atorvastatin (LIPITOR) 40 MG tablet Take 20 mg by mouth bedtime.   ? buPROPion (WELLBUTRIN XL) 150 MG 24 hr tablet Take 450 mg by mouth daily.   ? ceFAZolin in D5W (ANCEF) 2 gram/100 mL PgBk Infuse 100 mL (2 g total) into a venous catheter every 8 (eight) hours.   ? doxazosin (CARDURA) 4 MG tablet Take 4 mg by mouth at bedtime.   ? esomeprazole (NEXIUM) 40 MG capsule Take 40 mg by mouth 2 (two) times a day.   ? ferrous sulfate 325 (65 FE) MG tablet Take 1 tablet (325 mg total) by mouth daily with breakfast.   ? hydrOXYzine HCl (ATARAX) 25 MG tablet Take 1 tablet (25 mg total) by mouth every 4 (four) hours as needed (pain, muscle spasms, itching, anxiety).   ? levomilnacipran  (FETZIMA) 80 mg Cs24 ER capsule Take 120 mg by mouth daily.          ? methylphenidate HCl (RITALIN) 10 MG tablet Take 2 tablets (20 mg total) by mouth every morning.   ? methylphenidate HCl (RITALIN) 10 MG tablet Take 1 tablet (10 mg total) by mouth daily with lunch. At noon   ? oxyCODONE (ROXICODONE) 5 MG immediate release tablet Take 1-2 tablets (5-10 mg total) by mouth every 4 (four) hours as needed. Take 1 tab for pain 1-6/10, take 2 tabs for pain 7-10/10. (Patient taking differently: Take 10 mg by mouth 3 (three) times a day. 7AM and NOON to cover therapies, 4 PM to cover CPM, then 5 mg po at 8P scheduled    Again: Rifampin decreases effectiveness of Oxycodone      )   ? psyllium (METAMUCIL) 0.52 gram capsule Take 1.04 g by mouth 2 (two) times a day.   ? rifAMPin (RIMACTANE) 150 MG capsule Take 3 capsules (450 mg total) by mouth 2 (two) times a day.   ? senna-docusate (PERICOLACE) 8.6-50 mg tablet Take 1 tablet by mouth 2 (two) times a day as needed for constipation.       Labs:    Ref Range & Units 8/28/19 0740 8/26/19 0420    Hemoglobin 14.0 - 18.0 g/dL 8.4Low   7.9Low         Ref Range & Units 8/23/19 1738 8/22/19 1019    CRP 0.0 - 0.8 mg/dL 41.3High   27.5       Ref Range & Units 8/23/19 1738 8/22/19 1019    Sed Rate 0 - 15 mm/hr 89High   36High              Ref Range & Units 8/25/19 0656 8/24/19 0612    Sodium 136 - 145 mmol/L 136  131Low      Potassium 3.5 - 5.0 mmol/L 3.9  4.2     Chloride 98 - 107 mmol/L 105  102     CO2 22 - 31 mmol/L 25  21Low      Anion Gap, Calculation 5 - 18 mmol/L 6  8     Glucose 70 - 125 mg/dL 101  108     Calcium 8.5 - 10.5 mg/dL 8.4Low   8.4Low      BUN 8 - 22 mg/dL 17  21     Creatinine 0.70 - 1.30 mg/dL 0.82  0.88     GFR MDRD Non Af Amer >60 mL/min/1.73m2 >60  >60         Ref Range & Units 8/25/19 0656 8/25/19 0613   8/24/19    WBC 4.0 - 11.0 thou/uL 6.9  10.1     Hemoglobin 14.0 - 18.0 g/dL 8.3Low   8.9Low      RDW 11.0 - 14.5 % 13.2  12.8     Platelets 140 - 440 thou/uL  228  212     Neutrophils % 50 - 70 % 73High   84High      Lymphocytes % 20 - 40 % 11Low   8Low         8/23/19  Bilirubin, Total 0.0 - 1.0 mg/dL 0.4    Calcium 8.5 - 10.5 mg/dL 8.9     Protein, Total 6.0 - 8.0 g/dL 6.1     Albumin 3.5 - 5.0 g/dL 2.7Low      Alkaline Phosphatase 45 - 120 U/L 85     AST 0 - 40 U/L 11     ALT 0 - 45 U/L 12           Assessment / Plan:    ICD-10-CM    1. Infection of prosthetic joint, subsequent encounter: R TKA T84.50XD Cefazolin 6 weeks total, with Rifampin   2. Post-op pain G89.18 Lowered dose of Oxycodone with lower effectiveness secondary to Rifampin has caused pain increase. In crease to 10 mg during daytime hours scheduled, 5 mg HS, and continued prn   3. Depression, unspecified depression type F32.9 Bupropion effectiveness may be decreased as well by the Rifampin. Also on Fetzima   4. Attention deficit hyperactivity disorder (ADHD), unspecified ADHD type F90.9 On Ritalin   5. Gastroesophageal reflux disease without esophagitis K21.9 Nexium   6. Sleep apnea, unspecified type G47.30 Has home  CPAP with him   7. Mild intermittent asthma without complication J45.20            Electronically signed by: Rosa Metzger MD

## 2021-06-19 NOTE — LETTER
Letter by Lily Sanford CNP at      Author: Lily Sanford CNP Service: -- Author Type: --    Filed:  Encounter Date: 2019 Status: (Other)         Patient: Leena Glover   MR Number: [id number]   YOB: 1950   Date of Visit: 2019     [facility]      NAME:  Leena Glover             :  1950  MRN: [id number]  CODE STATUS:  FULL CODE    VISIT TYPE: DISCHARGE SUMMARY  FACILYTY: [facility] [[id number]]    HOSPITALIZATION: [facility],  TO 2019                   PRIMARY CARE PROVIDER: Willian Marcum MD    DISCHARGE DIAGNOSIS:      1. S/P knee surgery    2. Prosthetic joint infection, subsequent  encounter (H)    3. Physical deconditioning         DISCHARGE MEDICATIONS:         Medication List           Accurate as of 19  3:36 PM. If you have any questions, ask your nurse or doctor.               CHANGE how you take these medications    aspirin 325 MG tablet  Take 1 tablet (325 mg total) by mouth 2 (two) times a day with meals.  What changed:  additional instructions     oxyCODONE 5 MG immediate release tablet  Commonly known as:  ROXICODONE  10 mg PO three times a day and 5 mg at bedtime, and 1 tablet every 4 hours as needed for pain.  What changed:  Another medication with the same name was removed. Continue taking this medication, and follow the directions you see here.        CONTINUE taking these medications    acetaminophen 500 MG tablet  Commonly known as:  TYLENOL     ascorbic acid (vitamin C) 500 MG tablet  Commonly known as:  VITAMIN C     atorvastatin 40 MG tablet  Commonly known as:  LIPITOR     buPROPion 150 MG 24 hr tablet  Commonly known as:  WELLBUTRIN XL     ceFAZolin in D5W 2 gram/100 mL Pgbk  Commonly known as:  ANCEF  Infuse 100 mL (2 g total) into a venous catheter every 8 (eight) hours.     doxazosin 4 MG tablet  Commonly known as:  CARDURA     esomeprazole 40 MG capsule  Commonly known as:  NexIUM      ferrous sulfate 325 (65 FE) MG tablet  Take 1 tablet (325 mg total) by mouth daily with breakfast.     FETZIMA 80 mg Cs24 ER capsule  Generic drug:  levomilnacipran     * methylphenidate HCl 10 MG tablet  Commonly known as:  RITALIN  Take 2 tablets (20 mg total) by mouth every morning.     * methylphenidate HCl 10 MG tablet  Commonly known as:  RITALIN  Take 1 tablet (10 mg total) by mouth daily with lunch. At noon         * This list has 2 medication(s) that are the same as other medications prescribed for you. Read the directions carefully, and ask your doctor or other care provider to review them with you.            STOP taking these medications    hydrOXYzine HCl 25 MG tablet  Commonly known as:  ATARAX     psyllium 0.52 gram capsule  Commonly known as:  METAMUCIL     rifAMPin 150 MG capsule  Commonly known as:  RIMACTANE     senna-docusate 8.6-50 mg tablet  Commonly known as:  PERICOLACE            HISTORY OF PRESENT ILLNESS: Leena Glover is a 68 y.o. male being seen today for a face to face visit for an anticipated dc in am. He will require HHA Rn for home IV infusion. Pt was at [facility] for an infection of his recently replaced right knee joint. He was initially hospitalized from 8/1/2019 to 8/3/2019 and was discharged home after a successful surgery. He was then readmitted with infection on 8/23/2019 and discharged to TCU on 8/29/2019  He has a past medical history of Acid reflux, ADD (attention deficit disorder), Anxiety, Arthritis, Cancer (H), Depression, Hyperlipidemia, Moderate asthma without complication, unspecified whether persistent, and Sleep apnea. He also has no past medical history of Family history of malignant hyperthermia, History of anesthesia complications, History of blood clots, History of transfusion, PONV (postoperative nausea and vomiting), Seizures (H), or Stroke (H). We discussed IVAB training for family today so he could go home, IV company nurse in today. Reviewed  medication list with patient and wife/daughter today. PICC line with D/I drssing to [facility].    SKILLED NURSING FACILITY COURSE:  During this TCU stay, patient completed all anticipated goals of therapy.      PHYSICAL EXAMINATION:    Vitals:    09/12/19 1528   BP: 131/56   Pulse: 87   Temp: 98.7  F (37.1  C)   Weight: 196 lb 12.8 oz (89.3 kg)     GENERAL: Awake, Alert, oriented x3, not in any form of acute distress, answers questions appropriately, follows simple commands, conversant  HEENT: Head is normocephalic with normal hair distribution. No evidence of trauma. Ears: No acute purulent discharge. Eyes: Conjunctivae pink with no scleral jaundice. Nose: Normal mucosa and septum. NECK: Supple with no cervical or supraclavicular lymphadenopathy. Trachea is midline.   CHEST: No tenderness or deformity, no crepitus  LUNG: Clear to auscultation with good chest expansion. There are no crackles or wheezes, normal AP diameter.  BACK: No kyphosis of the thoracic spine. Symmetric, no curvature, ROM normal, no CVA tenderness, no spinal tenderness   CVS: There is good S1  S2, there are no murmurs, rubs, gallops, or heaves, rhythm is regular.  ABDOMEN: Globular and soft, nontender to palpation, non distended, no masses, no organomegaly, good bowel sounds, no rebound or guarding, no peritoneal signs.   EXTREMITIES: Atraumatic. Full range of motion on both upper and lower extremities, there is no tenderness to palpation, no pedal edema, no cyanosis or clubbing, no calf tenderness, normal cap refill, right knee  joint swelling.  SKIN: Warm and dry, no erythema noted, no rashes or lesions. R knee incision well approximated, steri strips  NEUROLOGICAL: The patient is oriented to person, place and time. Strength and sensation are grossly intact. Face is symmetric.        LABS:  All labs reviewed in the nursing home record.        DISCHARGE PLAN: I certify that this patient is under Dr. Metzger's care, seen by the NP, and had a  face-to-face encounter that meets the physician face-to-face encounter requirements on 9/12/19.  The encounter was in whole, or part related to the primary reason for home health.  The Patient is homebound due to: Septic right knee and  it is  taxing and it will take a considerable amount of effort for patient to leave the home.  He is dependent on others for transportation.  The patient is confined to his home and needs intermittent skilled nursing,for home IV infusion  The patient has been under the care of Dr. Metzger/NP and  initiated the establishment of the plan of care.    Patient to be followed by home care for nursing services for medication set up and teaching, symptom and disease processes monitoring and education.    Patient will follow up with PCP within 7  days after discharge for medication mangagment and appropriate lab studies.    Initial IV training completed by IV infusion nurse on 9/11 and 9/12 prior to dc home.    Post Discharge Medication Reconciliation Status: discharge medications reconciled and changed, per note/orders (see AVS)    Patient received a hard script for Oxycodone 5 mg.      Electronically signed by:  Lily Sanford CNP  This progress note was completed using Dragon software and there may be grammatical errors.      For documentation purposes, chart review, medication management, and discharge coordination of care was greater than 35 minutes

## 2021-06-28 NOTE — PROGRESS NOTES
Progress Notes by Lily Sanford CNP at 2019  9:13 AM     Author: Lily Sanford CNP Service: -- Author Type: Nurse Practitioner    Filed: 2019  3:49 PM Encounter Date: 2019 Status: Signed    : Lily Sanford CNP (Nurse Practitioner) Cosigner: Rosa Metzger MD at 2019  8:46 PM       [facility]      NAME:  Leena Glover             :  1950  MRN: [id number]  CODE STATUS:  FULL CODE    VISIT TYPE: DISCHARGE SUMMARY  FACILYTY: [facility] [[id number]]    HOSPITALIZATION: [facility],  TO 2019                   PRIMARY CARE PROVIDER: Willian Marcum MD    DISCHARGE DIAGNOSIS:      1. S/P knee surgery    2. Prosthetic joint infection, subsequent  encounter (H)    3. Physical deconditioning         DISCHARGE MEDICATIONS:         Medication List           Accurate as of 19  3:36 PM. If you have any questions, ask your nurse or doctor.               CHANGE how you take these medications    aspirin 325 MG tablet  Take 1 tablet (325 mg total) by mouth 2 (two) times a day with meals.  What changed:  additional instructions     oxyCODONE 5 MG immediate release tablet  Commonly known as:  ROXICODONE  10 mg PO three times a day and 5 mg at bedtime, and 1 tablet every 4 hours as needed for pain.  What changed:  Another medication with the same name was removed. Continue taking this medication, and follow the directions you see here.        CONTINUE taking these medications    acetaminophen 500 MG tablet  Commonly known as:  TYLENOL     ascorbic acid (vitamin C) 500 MG tablet  Commonly known as:  VITAMIN C     atorvastatin 40 MG tablet  Commonly known as:  LIPITOR     buPROPion 150 MG 24 hr tablet  Commonly known as:  WELLBUTRIN XL     ceFAZolin in D5W 2 gram/100 mL Pgbk  Commonly known as:  ANCEF  Infuse 100 mL (2 g total) into a venous catheter every 8 (eight) hours.     doxazosin 4 MG tablet  Commonly known as:  CARDURA      esomeprazole 40 MG capsule  Commonly known as:  NexIUM     ferrous sulfate 325 (65 FE) MG tablet  Take 1 tablet (325 mg total) by mouth daily with breakfast.     FETZIMA 80 mg Cs24 ER capsule  Generic drug:  levomilnacipran     * methylphenidate HCl 10 MG tablet  Commonly known as:  RITALIN  Take 2 tablets (20 mg total) by mouth every morning.     * methylphenidate HCl 10 MG tablet  Commonly known as:  RITALIN  Take 1 tablet (10 mg total) by mouth daily with lunch. At noon         * This list has 2 medication(s) that are the same as other medications prescribed for you. Read the directions carefully, and ask your doctor or other care provider to review them with you.            STOP taking these medications    hydrOXYzine HCl 25 MG tablet  Commonly known as:  ATARAX     psyllium 0.52 gram capsule  Commonly known as:  METAMUCIL     rifAMPin 150 MG capsule  Commonly known as:  RIMACTANE     senna-docusate 8.6-50 mg tablet  Commonly known as:  PERICOLACE            HISTORY OF PRESENT ILLNESS: Leena Glover is a 68 y.o. male being seen today for a face to face visit for an anticipated dc in am. He will require HHA Rn for home IV infusion. Pt was at [facility] for an infection of his recently replaced right knee joint. He was initially hospitalized from 8/1/2019 to 8/3/2019 and was discharged home after a successful surgery. He was then readmitted with infection on 8/23/2019 and discharged to TCU on 8/29/2019  He has a past medical history of Acid reflux, ADD (attention deficit disorder), Anxiety, Arthritis, Cancer (H), Depression, Hyperlipidemia, Moderate asthma without complication, unspecified whether persistent, and Sleep apnea. He also has no past medical history of Family history of malignant hyperthermia, History of anesthesia complications, History of blood clots, History of transfusion, PONV (postoperative nausea and vomiting), Seizures (H), or Stroke (H). We discussed IVAB training for family  today so he could go home, IV company nurse in today. Reviewed medication list with patient and wife/daughter today. PICC line with D/I drssing to [facility].    SKILLED NURSING FACILITY COURSE:  During this TCU stay, patient completed all anticipated goals of therapy.      PHYSICAL EXAMINATION:    Vitals:    09/12/19 1528   BP: 131/56   Pulse: 87   Temp: 98.7  F (37.1  C)   Weight: 196 lb 12.8 oz (89.3 kg)     GENERAL: Awake, Alert, oriented x3, not in any form of acute distress, answers questions appropriately, follows simple commands, conversant  HEENT: Head is normocephalic with normal hair distribution. No evidence of trauma. Ears: No acute purulent discharge. Eyes: Conjunctivae pink with no scleral jaundice. Nose: Normal mucosa and septum. NECK: Supple with no cervical or supraclavicular lymphadenopathy. Trachea is midline.   CHEST: No tenderness or deformity, no crepitus  LUNG: Clear to auscultation with good chest expansion. There are no crackles or wheezes, normal AP diameter.  BACK: No kyphosis of the thoracic spine. Symmetric, no curvature, ROM normal, no CVA tenderness, no spinal tenderness   CVS: There is good S1  S2, there are no murmurs, rubs, gallops, or heaves, rhythm is regular.  ABDOMEN: Globular and soft, nontender to palpation, non distended, no masses, no organomegaly, good bowel sounds, no rebound or guarding, no peritoneal signs.   EXTREMITIES: Atraumatic. Full range of motion on both upper and lower extremities, there is no tenderness to palpation, no pedal edema, no cyanosis or clubbing, no calf tenderness, normal cap refill, right knee  joint swelling.  SKIN: Warm and dry, no erythema noted, no rashes or lesions. R knee incision well approximated, steri strips  NEUROLOGICAL: The patient is oriented to person, place and time. Strength and sensation are grossly intact. Face is symmetric.        LABS:  All labs reviewed in the nursing home record.        DISCHARGE PLAN: I certify that this patient  is under Dr. Metzger's care, seen by the NP, and had a face-to-face encounter that meets the physician face-to-face encounter requirements on 9/12/19.  The encounter was in whole, or part related to the primary reason for home health.  The Patient is homebound due to: Septic right knee and  it is  taxing and it will take a considerable amount of effort for patient to leave the home.  He is dependent on others for transportation.  The patient is confined to his home and needs intermittent skilled nursing,for home IV infusion  The patient has been under the care of Dr. Metzger/NP and  initiated the establishment of the plan of care.    Patient to be followed by home care for nursing services for medication set up and teaching, symptom and disease processes monitoring and education.    Patient will follow up with PCP within 7  days after discharge for medication mangagment and appropriate lab studies.    Initial IV training completed by IV infusion nurse on 9/11 and 9/12 prior to dc home.    Post Discharge Medication Reconciliation Status: discharge medications reconciled and changed, per note/orders (see AVS)    Patient received a hard script for Oxycodone 5 mg.      Electronically signed by:  Lily Sanford CNP  This progress note was completed using Dragon software and there may be grammatical errors.      For documentation purposes, chart review, medication management, and discharge coordination of care was greater than 35 minutes

## 2021-08-06 ENCOUNTER — MYC MEDICAL ADVICE (OUTPATIENT)
Dept: INFECTIOUS DISEASES | Facility: CLINIC | Age: 71
End: 2021-08-06

## 2021-10-24 ENCOUNTER — HEALTH MAINTENANCE LETTER (OUTPATIENT)
Age: 71
End: 2021-10-24

## 2022-01-11 ENCOUNTER — LAB REQUISITION (OUTPATIENT)
Dept: LAB | Facility: CLINIC | Age: 72
End: 2022-01-11
Payer: COMMERCIAL

## 2022-01-11 DIAGNOSIS — Z03.818 ENCOUNTER FOR OBSERVATION FOR SUSPECTED EXPOSURE TO OTHER BIOLOGICAL AGENTS RULED OUT: ICD-10-CM

## 2022-01-11 PROCEDURE — U0005 INFEC AGEN DETEC AMPLI PROBE: HCPCS | Mod: ORL | Performed by: PHYSICIAN ASSISTANT

## 2022-01-12 LAB — SARS-COV-2 RNA RESP QL NAA+PROBE: POSITIVE

## 2022-03-11 ENCOUNTER — LAB REQUISITION (OUTPATIENT)
Dept: LAB | Facility: CLINIC | Age: 72
End: 2022-03-11

## 2022-03-11 DIAGNOSIS — Z12.5 ENCOUNTER FOR SCREENING FOR MALIGNANT NEOPLASM OF PROSTATE: ICD-10-CM

## 2022-03-11 DIAGNOSIS — E78.2 MIXED HYPERLIPIDEMIA: ICD-10-CM

## 2022-03-11 LAB
CHOLEST SERPL-MCNC: 190 MG/DL
FASTING STATUS PATIENT QL REPORTED: ABNORMAL
HDLC SERPL-MCNC: 47 MG/DL
LDLC SERPL CALC-MCNC: 70 MG/DL
PSA SERPL-MCNC: 0.97 UG/L (ref 0–6.5)
TRIGL SERPL-MCNC: 367 MG/DL

## 2022-03-11 PROCEDURE — 80061 LIPID PANEL: CPT | Performed by: FAMILY MEDICINE

## 2022-03-11 PROCEDURE — G0103 PSA SCREENING: HCPCS | Performed by: FAMILY MEDICINE

## 2022-03-14 ENCOUNTER — LAB REQUISITION (OUTPATIENT)
Dept: LAB | Facility: CLINIC | Age: 72
End: 2022-03-14

## 2022-03-14 DIAGNOSIS — R39.89 OTHER SYMPTOMS AND SIGNS INVOLVING THE GENITOURINARY SYSTEM: ICD-10-CM

## 2022-03-14 PROCEDURE — 87086 URINE CULTURE/COLONY COUNT: CPT | Performed by: FAMILY MEDICINE

## 2022-03-16 LAB — BACTERIA UR CULT: NO GROWTH

## 2022-06-09 ENCOUNTER — LAB REQUISITION (OUTPATIENT)
Dept: LAB | Facility: CLINIC | Age: 72
End: 2022-06-09

## 2022-06-09 DIAGNOSIS — R60.0 LOCALIZED EDEMA: ICD-10-CM

## 2022-06-09 DIAGNOSIS — M13.0 POLYARTHRITIS, UNSPECIFIED: ICD-10-CM

## 2022-06-09 LAB
ANION GAP SERPL CALCULATED.3IONS-SCNC: 11 MMOL/L (ref 5–18)
BUN SERPL-MCNC: 13 MG/DL (ref 8–28)
C REACTIVE PROTEIN LHE: <0.1 MG/DL (ref 0–0.8)
CALCIUM SERPL-MCNC: 9 MG/DL (ref 8.5–10.5)
CHLORIDE BLD-SCNC: 105 MMOL/L (ref 98–107)
CO2 SERPL-SCNC: 23 MMOL/L (ref 22–31)
CREAT SERPL-MCNC: 1.13 MG/DL (ref 0.7–1.3)
GFR SERPL CREATININE-BSD FRML MDRD: 69 ML/MIN/1.73M2
GLUCOSE BLD-MCNC: 89 MG/DL (ref 70–125)
POTASSIUM BLD-SCNC: 4.1 MMOL/L (ref 3.5–5)
SODIUM SERPL-SCNC: 139 MMOL/L (ref 136–145)
URATE SERPL-MCNC: 6.5 MG/DL (ref 3–8)

## 2022-06-09 PROCEDURE — 80048 BASIC METABOLIC PNL TOTAL CA: CPT | Performed by: NURSE PRACTITIONER

## 2022-06-09 PROCEDURE — 86140 C-REACTIVE PROTEIN: CPT | Performed by: NURSE PRACTITIONER

## 2022-06-09 PROCEDURE — 84550 ASSAY OF BLOOD/URIC ACID: CPT | Performed by: NURSE PRACTITIONER

## 2022-10-15 ENCOUNTER — HEALTH MAINTENANCE LETTER (OUTPATIENT)
Age: 72
End: 2022-10-15

## 2022-12-15 ENCOUNTER — LAB REQUISITION (OUTPATIENT)
Dept: LAB | Facility: CLINIC | Age: 72
End: 2022-12-15

## 2022-12-15 DIAGNOSIS — R60.0 LOCALIZED EDEMA: ICD-10-CM

## 2022-12-15 LAB — POTASSIUM SERPL-SCNC: 4.2 MMOL/L (ref 3.4–5.3)

## 2022-12-15 PROCEDURE — 84132 ASSAY OF SERUM POTASSIUM: CPT | Performed by: FAMILY MEDICINE

## 2023-06-01 ENCOUNTER — HEALTH MAINTENANCE LETTER (OUTPATIENT)
Age: 73
End: 2023-06-01

## 2023-06-15 ENCOUNTER — LAB REQUISITION (OUTPATIENT)
Dept: LAB | Facility: CLINIC | Age: 73
End: 2023-06-15

## 2023-06-15 DIAGNOSIS — E78.2 MIXED HYPERLIPIDEMIA: ICD-10-CM

## 2023-06-15 DIAGNOSIS — F10.21 ALCOHOL DEPENDENCE, IN REMISSION (H): ICD-10-CM

## 2023-06-15 LAB
ALBUMIN SERPL BCG-MCNC: 3.9 G/DL (ref 3.5–5.2)
ALP SERPL-CCNC: 78 U/L (ref 40–129)
ALT SERPL W P-5'-P-CCNC: 28 U/L (ref 0–70)
ANION GAP SERPL CALCULATED.3IONS-SCNC: 11 MMOL/L (ref 7–15)
AST SERPL W P-5'-P-CCNC: 23 U/L (ref 0–45)
BILIRUB SERPL-MCNC: 0.2 MG/DL
BUN SERPL-MCNC: 13.4 MG/DL (ref 8–23)
CALCIUM SERPL-MCNC: 8.6 MG/DL (ref 8.8–10.2)
CHLORIDE SERPL-SCNC: 104 MMOL/L (ref 98–107)
CHOLEST SERPL-MCNC: 182 MG/DL
CREAT SERPL-MCNC: 1.24 MG/DL (ref 0.67–1.17)
DEPRECATED HCO3 PLAS-SCNC: 23 MMOL/L (ref 22–29)
GFR SERPL CREATININE-BSD FRML MDRD: 61 ML/MIN/1.73M2
GLUCOSE SERPL-MCNC: 73 MG/DL (ref 70–99)
HDLC SERPL-MCNC: 53 MG/DL
LDLC SERPL CALC-MCNC: 69 MG/DL
NONHDLC SERPL-MCNC: 129 MG/DL
POTASSIUM SERPL-SCNC: 4.3 MMOL/L (ref 3.4–5.3)
PROT SERPL-MCNC: 5.9 G/DL (ref 6.4–8.3)
SODIUM SERPL-SCNC: 138 MMOL/L (ref 136–145)
TRIGL SERPL-MCNC: 300 MG/DL

## 2023-06-15 PROCEDURE — 83718 ASSAY OF LIPOPROTEIN: CPT | Performed by: FAMILY MEDICINE

## 2023-06-15 PROCEDURE — 80053 COMPREHEN METABOLIC PANEL: CPT | Performed by: FAMILY MEDICINE

## 2023-08-16 ENCOUNTER — TRANSFERRED RECORDS (OUTPATIENT)
Dept: HEALTH INFORMATION MANAGEMENT | Facility: CLINIC | Age: 73
End: 2023-08-16

## 2023-08-24 ENCOUNTER — TRANSFERRED RECORDS (OUTPATIENT)
Dept: HEALTH INFORMATION MANAGEMENT | Facility: CLINIC | Age: 73
End: 2023-08-24

## 2023-09-06 ENCOUNTER — TRANSFERRED RECORDS (OUTPATIENT)
Dept: HEALTH INFORMATION MANAGEMENT | Facility: CLINIC | Age: 73
End: 2023-09-06
Payer: COMMERCIAL

## 2023-09-13 ENCOUNTER — TRANSFERRED RECORDS (OUTPATIENT)
Dept: HEALTH INFORMATION MANAGEMENT | Facility: CLINIC | Age: 73
End: 2023-09-13
Payer: COMMERCIAL

## 2023-09-26 ENCOUNTER — TRANSFERRED RECORDS (OUTPATIENT)
Dept: HEALTH INFORMATION MANAGEMENT | Facility: CLINIC | Age: 73
End: 2023-09-26
Payer: COMMERCIAL

## 2023-09-27 ENCOUNTER — MEDICAL CORRESPONDENCE (OUTPATIENT)
Dept: HEALTH INFORMATION MANAGEMENT | Facility: CLINIC | Age: 73
End: 2023-09-27
Payer: COMMERCIAL

## 2023-09-28 ENCOUNTER — TRANSCRIBE ORDERS (OUTPATIENT)
Dept: OTHER | Age: 73
End: 2023-09-28

## 2023-09-28 DIAGNOSIS — R22.2 CHEST MASS: Primary | ICD-10-CM

## 2023-10-03 ENCOUNTER — TRANSCRIBE ORDERS (OUTPATIENT)
Dept: OTHER | Age: 73
End: 2023-10-03

## 2023-10-04 ENCOUNTER — TRANSCRIBE ORDERS (OUTPATIENT)
Dept: OTHER | Age: 73
End: 2023-10-04

## 2023-10-04 DIAGNOSIS — R22.2 CHEST MASS: ICD-10-CM

## 2023-10-04 DIAGNOSIS — Z01.818 PRE-OP TESTING: Primary | ICD-10-CM

## 2023-10-11 ENCOUNTER — HOSPITAL ENCOUNTER (OUTPATIENT)
Dept: CARDIAC REHAB | Facility: CLINIC | Age: 73
Discharge: HOME | End: 2023-10-11
Attending: SURGERY
Payer: COMMERCIAL

## 2023-10-11 DIAGNOSIS — Z01.818 PRE-OP TESTING: ICD-10-CM

## 2023-10-11 DIAGNOSIS — R22.2 CHEST MASS: ICD-10-CM

## 2023-10-11 PROCEDURE — 94375 RESPIRATORY FLOW VOLUME LOOP: CPT

## 2023-10-11 PROCEDURE — 94729 DIFFUSING CAPACITY: CPT

## 2023-10-11 PROCEDURE — 94726 PLETHYSMOGRAPHY LUNG VOLUMES: CPT

## 2023-10-13 ENCOUNTER — OFFICE VISIT (OUTPATIENT)
Dept: CARDIOLOGY | Facility: CLINIC | Age: 73
End: 2023-10-13
Attending: SURGERY
Payer: COMMERCIAL

## 2023-10-13 VITALS
HEART RATE: 62 BPM | DIASTOLIC BLOOD PRESSURE: 62 MMHG | HEIGHT: 73 IN | OXYGEN SATURATION: 98 % | SYSTOLIC BLOOD PRESSURE: 116 MMHG | BODY MASS INDEX: 28.34 KG/M2 | WEIGHT: 213.8 LBS

## 2023-10-13 DIAGNOSIS — R94.31 ABNORMAL ELECTROCARDIOGRAM: Primary | ICD-10-CM

## 2023-10-13 DIAGNOSIS — R22.2 CHEST MASS: ICD-10-CM

## 2023-10-13 LAB
DLCOUNC-%PRED-PRE: 93 %
DLCOUNC-PRE: 24.99 ML/MIN/MMHG
DLCOUNC-PRED: 26.76 ML/MIN/MMHG
ERV-%PRED-PRE: 56 %
ERV-PRE: 0.87 L
ERV-PRED: 1.53 L
EXPTIME-PRE: 8.38 SEC
FEF2575-%PRED-PRE: 105 %
FEF2575-PRE: 2.38 L/SEC
FEF2575-PRED: 2.26 L/SEC
FEFMAX-%PRED-PRE: 96 %
FEFMAX-PRE: 8.09 L/SEC
FEFMAX-PRED: 8.42 L/SEC
FEV1-%PRED-PRE: 119 %
FEV1-PRE: 3.66 L
FEV1FEV6-PRE: 74 %
FEV1FEV6-PRED: 77 %
FEV1FVC-PRE: 72 %
FEV1FVC-PRED: 76 %
FEV1SVC-PRE: 72 %
FEV1SVC-PRED: 71 %
FIFMAX-PRE: 3.98 L/SEC
FRCPLETH-%PRED-PRE: 117 %
FRCPLETH-PRE: 4.53 L
FRCPLETH-PRED: 3.85 L
FVC-%PRED-PRE: 126 %
FVC-PRE: 5.11 L
FVC-PRED: 4.05 L
IC-%PRED-PRE: 138 %
IC-PRE: 4.23 L
IC-PRED: 3.05 L
RVPLETH-%PRED-PRE: 131 %
RVPLETH-PRE: 3.66 L
RVPLETH-PRED: 2.79 L
TLCPLETH-%PRED-PRE: 116 %
TLCPLETH-PRE: 8.76 L
TLCPLETH-PRED: 7.53 L
VA-%PRED-PRE: 113 %
VA-PRE: 7.77 L
VC-%PRED-PRE: 118 %
VC-PRE: 5.1 L
VC-PRED: 4.31 L

## 2023-10-13 PROCEDURE — 93000 ELECTROCARDIOGRAM COMPLETE: CPT | Performed by: INTERNAL MEDICINE

## 2023-10-13 PROCEDURE — 99205 OFFICE O/P NEW HI 60 MIN: CPT | Performed by: INTERNAL MEDICINE

## 2023-10-13 NOTE — PROGRESS NOTES
HPI and Plan:   I had the pleasure of meeting Leena Glover today at the [facility].  The patient has no known heart disease.  He has a large 4 x 6 cm mass in his left posterior superior segment of his chest, left subclavian artery is feeding blood to it he had a CT scan and a pet scan which I reviewed with him today the differential listed was sarcoma nerve sheath neoplasm lymphoma metastatic disease small cell cancer.  Patient has never been a smoker.  He has a mild cholesterol abnormality with elevated triglyceride but reasonably good LDL and HDL.  He had pulmonary function test the results are preliminary with but relatively normal.  He had remote echocardiogram several years ago which was unremarkable showing only mild left atrial enlargement no valve disease normal heart function.  That was all at an outside hospital  He reports no cardiovascular symptoms specifically no palpitations dizziness shortness of breath chest pain exercise intolerance syncope    EKG here is read as anterior infarct I think it is more due to lead placement there is still R waves anterior    At this point his physical exam is rather unremarkable.  I Loreto recommend since he needs preop clearance for this large surgery we will get a stress echocardiogram this will allow us to determine if there is underlying coronary disease valve disease or metastatic disease to the heart pericardial effusion etc    Today's visit was 55 minutes this includes reviewing the outside records including normal electrolytes a remote CBC lipid panel PET scan CT scan remote echo.  We will try to get the stress echo in the upcoming week to clear him cardiac wise for his surgery I will schedule an office visit with a nurse practitioner however if the test is normal he can probably call and have my nurse review that with him and he can cancel the visit we will keep the slot open for nurse practitioner visit in case there is an abnormality found on the above  tests..    Improving continue I evaluated the therapy routine of feeding okay I forgot to do that    Orders Placed This Encounter   Procedures    Follow-Up with Cardiology PHYLICIA    EKG 12-lead complete w/read - Clinics (performed today)    Exercise Stress Echocardiogram     No orders of the defined types were placed in this encounter.    There are no discontinued medications.      Encounter Diagnoses   Name Primary?    Chest mass     Abnormal electrocardiogram Yes       CURRENT MEDICATIONS:  Current Outpatient Medications   Medication Sig Dispense Refill    acetaminophen (TYLENOL) 500 MG tablet [ACETAMINOPHEN (TYLENOL) 500 MG TABLET] Take 500-1,000 mg by mouth every 6 (six) hours as needed for pain.      albuterol (PROAIR HFA;PROVENTIL HFA;VENTOLIN HFA) 90 mcg/actuation inhaler [ALBUTEROL (PROAIR HFA;PROVENTIL HFA;VENTOLIN HFA) 90 MCG/ACTUATION INHALER] Inhale 2 puffs every 6 (six) hours as needed for wheezing.      ascorbic acid (VITAMIN C) 500 MG tablet [ASCORBIC ACID (VITAMIN C) 500 MG TABLET] Take 500 mg by mouth at bedtime.             aspirin 325 MG EC tablet [ASPIRIN 325 MG EC TABLET] Take 1 tablet (325 mg total) by mouth daily. Resume after postop aspirin course is completed.  0    atorvastatin (LIPITOR) 40 MG tablet [ATORVASTATIN (LIPITOR) 40 MG TABLET] Take 20 mg by mouth bedtime.      buPROPion (WELLBUTRIN XL) 150 MG 24 hr tablet [BUPROPION (WELLBUTRIN XL) 150 MG 24 HR TABLET] Take 450 mg by mouth daily.      busPIRone (BUSPAR) 15 MG tablet [BUSPIRONE (BUSPAR) 15 MG TABLET] Take 15 mg by mouth daily.       cephalexin (KEFLEX) 500 MG capsule [CEPHALEXIN (KEFLEX) 500 MG CAPSULE] Take 500 mg by mouth 2 (two) times a day.      esomeprazole (NEXIUM) 40 MG capsule [ESOMEPRAZOLE (NEXIUM) 40 MG CAPSULE] Take 40 mg by mouth 2 (two) times a day.      famotidine (PEPCID) 20 MG tablet [FAMOTIDINE (PEPCID) 20 MG TABLET] Take 40 mg by mouth at bedtime.      hydrOXYzine HCL (ATARAX) 10 MG tablet [HYDROXYZINE HCL (ATARAX)  10 MG TABLET] Take 1 tablet (10 mg total) by mouth every 6 (six) hours as needed for itching (with pain, moderate pain). 30 tablet 0    methylphenidate HCl (RITALIN) 10 MG tablet [METHYLPHENIDATE HCL (RITALIN) 10 MG TABLET] Take 1 tablet (10 mg total) by mouth daily with lunch. At noon 7 tablet 0    mirtazapine (REMERON) 30 MG tablet [MIRTAZAPINE (REMERON) 30 MG TABLET] Take 30 mg by mouth at bedtime.      oxyCODONE (ROXICODONE) 5 MG immediate release tablet [OXYCODONE (ROXICODONE) 5 MG IMMEDIATE RELEASE TABLET] Take 1-2 tablets (5-10 mg total) by mouth every 4 (four) hours as needed for pain (moderate to severe pain). 30 tablet 0    senna-docusate (PERICOLACE) 8.6-50 mg tablet [SENNA-DOCUSATE (PERICOLACE) 8.6-50 MG TABLET] Take 1-2 tablets by mouth 2 (two) times a day. Take while on oral narcotics to prevent or treat constipation. 30 tablet 0    tamsulosin (FLOMAX) 0.4 mg cap [TAMSULOSIN (FLOMAX) 0.4 MG CAP] Take 0.4 mg by mouth Daily after breakfast.       aspirin 81 MG EC tablet [ASPIRIN 81 MG EC TABLET] Take 1 tablet (81 mg total) by mouth 2 (two) times a day. 60 tablet 0    methylphenidate HCl (RITALIN) 10 MG tablet [METHYLPHENIDATE HCL (RITALIN) 10 MG TABLET] Take 2 tablets (20 mg total) by mouth every morning. 7 tablet 0       ALLERGIES     Allergies   Allergen Reactions    Prilosec [Omeprazole] Diarrhea       PAST MEDICAL HISTORY:  Past Medical History:   Diagnosis Date    Acid reflux     very large hiatal hernia (>50% stomach in chest)    ADD (attention deficit disorder)     Alcoholism (H)     sober now    Anxiety     Arthritis     Cancer (H)     being evaluated for 4x6cm mass in left  posterior superior mediastinum ?sarcoma,?neural sheath?,?lymphoma/ ?small cell CA    Depression     Esophageal dilatation     Hyperlipidemia     Hyperlipidemia LDL goal <100     Infection due to 2019 novel coronavirus 2022    Moderate asthma without complication, unspecified whether persistent     Sleep apnea     uses cpap  "      PAST SURGICAL HISTORY:  Past Surgical History:   Procedure Laterality Date    APPENDECTOMY      ARTHROPLASTY REVISION HIP Left 4/6/2021    Procedure: LEFT TOTAL HIP REVISION;  Surgeon: Joey Aly MD;  Location: [facility] OR;  Service: Orthopedics    CATARACT EXTRACTION      HC EXPLOR/DRAIN KNEE,INFECTN Right 8/23/2019    Procedure: INCISION AND DRAINAGE/DEBRIDMENT OF RIGHT TOTAL KNEE REPLACMENT, EXCHANGE POLY;  Surgeon: Konstantin Mei MD;  Location: [facility] OR;  Service: Orthopedics    JOINT REPLACEMENT Right 2012    knee cap replaced    JOINT REPLACEMENT Left 2007    KRISTEN, socket replaced    PICC AND MIDLINE TEAM LINE INSERTION  8/28/2019         ZZC TOTAL KNEE ARTHROPLASTY Right 8/1/2019    Procedure: TO TOTAL KNEE ARTHROPLASTY;  Surgeon: Chris Polanco MD;  Location: [facility];  Service: Orthopedics       FAMILY HISTORY:  Family History   Problem Relation Age of Onset    Heart Disease Mother     Prostate Cancer Father     Cerebrovascular Disease Father        SOCIAL HISTORY:  Social History     Socioeconomic History    Marital status:    Occupational History    Occupation: retired for disables adults   Tobacco Use    Smoking status: Never    Smokeless tobacco: Never   Substance and Sexual Activity    Alcohol use: No     Comment: Alcoholic Drinks/day: sober for 7 years    Drug use: No     Comment: caffeine  2L/day       Review of Systems:  Skin:  not assessed     Eyes:  not assessed    ENT:  not assessed    Respiratory:  Negative    Cardiovascular:  Negative    Gastroenterology: not assessed    Genitourinary:  not assessed    Musculoskeletal:  not assessed    Neurologic:  not assessed    Psychiatric:  not assessed    Heme/Lymph/Imm:  not assessed    Endocrine:  not assessed      Physical Exam:  Vitals: /62 (BP Location: Right arm, Patient Position: Right side, Cuff Size: Adult Regular)   Pulse 62   Ht 1.854 m (6' 1\")   Wt 97 kg (213 lb 12.8 oz)   SpO2 98%  "  BMI 28.21 kg/m      Constitutional:  cooperative, alert and oriented, well developed, well nourished, in no acute distress        Skin:  warm and dry to the touch, no apparent skin lesions or masses noted     bruise noted over left pectoral area-pt said he was scratching there    Head:  normocephalic, no masses or lesions        Eyes:  pupils equal and round, conjunctivae and lids unremarkable, sclera white, no xanthalasma, EOMS intact, no nystagmus        Lymph:No Cervical lymphadenopathy present;No thyromegaly     ENT:  no pallor or cyanosis, dentition good        Neck:  carotid pulses are full and equal bilaterally, JVP normal, no carotid bruit   NO SUBCLAVIAN VEIN SYNDROME-no dilated neck veins    Respiratory:  normal breath sounds, clear to auscultation, normal A-P diameter, normal symmetry, normal respiratory excursion, no use of accessory muscles         Cardiac: regular rhythm, normal S1/S2, no S3 or S4, apical impulse not displaced, no murmurs, gallops or rubs             minimally split s1  pulses full and equal, no bruits auscultated                                        GI:  abdomen soft, non-tender, BS normoactive, no mass, no HSM, no bruits        Extremities and Muscular Skeletal:  no deformities, clubbing, cyanosis, erythema observed;no edema              Neurological:  no gross motor deficits        Psych:  Alert and Oriented x 3      Recent Lab Results:  LIPID RESULTS:  Lab Results   Component Value Date    CHOL 182 06/15/2023    HDL 53 06/15/2023    LDL 69 06/15/2023    TRIG 300 (H) 06/15/2023       LIVER ENZYME RESULTS:  Lab Results   Component Value Date    AST 23 06/15/2023    ALT 28 06/15/2023       CBC RESULTS:  Lab Results   Component Value Date    WBC 7.3 02/10/2021    RBC 4.43 02/10/2021    HGB 10.9 (L) 04/07/2021    HCT 39.6 (L) 02/10/2021    MCV 89 02/10/2021    MCH 29.1 02/10/2021    MCHC 32.6 02/10/2021    RDW 13.4 02/10/2021     02/10/2021       BMP RESULTS:  Lab Results  "  Component Value Date     06/15/2023    POTASSIUM 4.3 06/15/2023    POTASSIUM 4.1 06/09/2022    CHLORIDE 104 06/15/2023    CHLORIDE 105 06/09/2022    CO2 23 06/15/2023    CO2 23 06/09/2022    ANIONGAP 11 06/15/2023    ANIONGAP 11 06/09/2022    GLC 73 06/15/2023    GLC 89 06/09/2022    BUN 13.4 06/15/2023    BUN 13 06/09/2022    CR 1.24 (H) 06/15/2023    GFRESTIMATED 61 06/15/2023    GFRESTIMATED >60 06/30/2020    GFRESTBLACK >60 06/30/2020    SCOTTY 8.6 (L) 06/15/2023        A1C RESULTS:  No results found for: \"A1C\"    INR RESULTS:  Lab Results   Component Value Date    INR 1.13 (H) 08/24/2019    INR 0.94 08/01/2019           CC  MD DENISE Castaneda [address] [place] [address]  "

## 2023-10-13 NOTE — LETTER
10/13/2023    Willian Marcum MD, MD  [address]  [address] [place] [address]    RE: Leena Glover       Dear Colleague,     I had the pleasure of seeing Leena Glover in the [facility].  HPI and Plan:   I had the pleasure of meeting Leena Glover today at the [facility].  The patient has no known heart disease.  He has a large 4 x 6 cm mass in his left posterior superior segment of his chest, left subclavian artery is feeding blood to it he had a CT scan and a pet scan which I reviewed with him today the differential listed was sarcoma nerve sheath neoplasm lymphoma metastatic disease small cell cancer.  Patient has never been a smoker.  He has a mild cholesterol abnormality with elevated triglyceride but reasonably good LDL and HDL.  He had pulmonary function test the results are preliminary with but relatively normal.  He had remote echocardiogram several years ago which was unremarkable showing only mild left atrial enlargement no valve disease normal heart function.  That was all at an outside hospital  He reports no cardiovascular symptoms specifically no palpitations dizziness shortness of breath chest pain exercise intolerance syncope    EKG here is read as anterior infarct I think it is more due to lead placement there is still R waves anterior    At this point his physical exam is rather unremarkable.  I Loreto recommend since he needs preop clearance for this large surgery we will get a stress echocardiogram this will allow us to determine if there is underlying coronary disease valve disease or metastatic disease to the heart pericardial effusion etc    Today's visit was 55 minutes this includes reviewing the outside records including normal electrolytes a remote CBC lipid panel PET scan CT scan remote echo.  We will try to get the stress echo in the upcoming week to clear him cardiac wise for his surgery I will schedule an office visit with a nurse practitioner  however if the test is normal he can probably call and have my nurse review that with him and he can cancel the visit we will keep the slot open for nurse practitioner visit in case there is an abnormality found on the above tests..    Improving continue I evaluated the therapy routine of feeding okay I forgot to do that    Orders Placed This Encounter   Procedures    Follow-Up with Cardiology PHYLICIA    EKG 12-lead complete w/read - Clinics (performed today)    Exercise Stress Echocardiogram     No orders of the defined types were placed in this encounter.    There are no discontinued medications.      Encounter Diagnoses   Name Primary?    Chest mass     Abnormal electrocardiogram Yes       CURRENT MEDICATIONS:  Current Outpatient Medications   Medication Sig Dispense Refill    acetaminophen (TYLENOL) 500 MG tablet [ACETAMINOPHEN (TYLENOL) 500 MG TABLET] Take 500-1,000 mg by mouth every 6 (six) hours as needed for pain.      albuterol (PROAIR HFA;PROVENTIL HFA;VENTOLIN HFA) 90 mcg/actuation inhaler [ALBUTEROL (PROAIR HFA;PROVENTIL HFA;VENTOLIN HFA) 90 MCG/ACTUATION INHALER] Inhale 2 puffs every 6 (six) hours as needed for wheezing.      ascorbic acid (VITAMIN C) 500 MG tablet [ASCORBIC ACID (VITAMIN C) 500 MG TABLET] Take 500 mg by mouth at bedtime.             aspirin 325 MG EC tablet [ASPIRIN 325 MG EC TABLET] Take 1 tablet (325 mg total) by mouth daily. Resume after postop aspirin course is completed.  0    atorvastatin (LIPITOR) 40 MG tablet [ATORVASTATIN (LIPITOR) 40 MG TABLET] Take 20 mg by mouth bedtime.      buPROPion (WELLBUTRIN XL) 150 MG 24 hr tablet [BUPROPION (WELLBUTRIN XL) 150 MG 24 HR TABLET] Take 450 mg by mouth daily.      busPIRone (BUSPAR) 15 MG tablet [BUSPIRONE (BUSPAR) 15 MG TABLET] Take 15 mg by mouth daily.       cephalexin (KEFLEX) 500 MG capsule [CEPHALEXIN (KEFLEX) 500 MG CAPSULE] Take 500 mg by mouth 2 (two) times a day.      esomeprazole (NEXIUM) 40 MG capsule [ESOMEPRAZOLE (NEXIUM) 40 MG  CAPSULE] Take 40 mg by mouth 2 (two) times a day.      famotidine (PEPCID) 20 MG tablet [FAMOTIDINE (PEPCID) 20 MG TABLET] Take 40 mg by mouth at bedtime.      hydrOXYzine HCL (ATARAX) 10 MG tablet [HYDROXYZINE HCL (ATARAX) 10 MG TABLET] Take 1 tablet (10 mg total) by mouth every 6 (six) hours as needed for itching (with pain, moderate pain). 30 tablet 0    methylphenidate HCl (RITALIN) 10 MG tablet [METHYLPHENIDATE HCL (RITALIN) 10 MG TABLET] Take 1 tablet (10 mg total) by mouth daily with lunch. At noon 7 tablet 0    mirtazapine (REMERON) 30 MG tablet [MIRTAZAPINE (REMERON) 30 MG TABLET] Take 30 mg by mouth at bedtime.      oxyCODONE (ROXICODONE) 5 MG immediate release tablet [OXYCODONE (ROXICODONE) 5 MG IMMEDIATE RELEASE TABLET] Take 1-2 tablets (5-10 mg total) by mouth every 4 (four) hours as needed for pain (moderate to severe pain). 30 tablet 0    senna-docusate (PERICOLACE) 8.6-50 mg tablet [SENNA-DOCUSATE (PERICOLACE) 8.6-50 MG TABLET] Take 1-2 tablets by mouth 2 (two) times a day. Take while on oral narcotics to prevent or treat constipation. 30 tablet 0    tamsulosin (FLOMAX) 0.4 mg cap [TAMSULOSIN (FLOMAX) 0.4 MG CAP] Take 0.4 mg by mouth Daily after breakfast.       aspirin 81 MG EC tablet [ASPIRIN 81 MG EC TABLET] Take 1 tablet (81 mg total) by mouth 2 (two) times a day. 60 tablet 0    methylphenidate HCl (RITALIN) 10 MG tablet [METHYLPHENIDATE HCL (RITALIN) 10 MG TABLET] Take 2 tablets (20 mg total) by mouth every morning. 7 tablet 0       ALLERGIES     Allergies   Allergen Reactions    Prilosec [Omeprazole] Diarrhea       PAST MEDICAL HISTORY:  Past Medical History:   Diagnosis Date    Acid reflux     very large hiatal hernia (>50% stomach in chest)    ADD (attention deficit disorder)     Alcoholism (H)     sober now    Anxiety     Arthritis     Cancer (H)     being evaluated for 4x6cm mass in left  posterior superior mediastinum ?sarcoma,?neural sheath?,?lymphoma/ ?small cell CA    Depression      Esophageal dilatation     Hyperlipidemia     Hyperlipidemia LDL goal <100     Infection due to 2019 novel coronavirus 2022    Moderate asthma without complication, unspecified whether persistent     Sleep apnea     uses cpap       PAST SURGICAL HISTORY:  Past Surgical History:   Procedure Laterality Date    APPENDECTOMY      ARTHROPLASTY REVISION HIP Left 4/6/2021    Procedure: LEFT TOTAL HIP REVISION;  Surgeon: Joey Aly MD;  Location: [facility];  Service: Orthopedics    CATARACT EXTRACTION      HC EXPLOR/DRAIN KNEE,INFECTN Right 8/23/2019    Procedure: INCISION AND DRAINAGE/DEBRIDMENT OF RIGHT TOTAL KNEE REPLACMENT, EXCHANGE POLY;  Surgeon: Konstantin Mei MD;  Location: [facility] OR;  Service: Orthopedics    JOINT REPLACEMENT Right 2012    knee cap replaced    JOINT REPLACEMENT Left 2007    KRISTEN, socket replaced    PICC AND MIDLINE TEAM LINE INSERTION  8/28/2019         ZZC TOTAL KNEE ARTHROPLASTY Right 8/1/2019    Procedure: TO TOTAL KNEE ARTHROPLASTY;  Surgeon: Chris Polanco MD;  Location: [facility];  Service: Orthopedics       FAMILY HISTORY:  Family History   Problem Relation Age of Onset    Heart Disease Mother     Prostate Cancer Father     Cerebrovascular Disease Father        SOCIAL HISTORY:  Social History     Socioeconomic History    Marital status:    Occupational History    Occupation: retired for disables adults   Tobacco Use    Smoking status: Never    Smokeless tobacco: Never   Substance and Sexual Activity    Alcohol use: No     Comment: Alcoholic Drinks/day: sober for 7 years    Drug use: No     Comment: caffeine  2L/day       Review of Systems:  Skin:  not assessed     Eyes:  not assessed    ENT:  not assessed    Respiratory:  Negative    Cardiovascular:  Negative    Gastroenterology: not assessed    Genitourinary:  not assessed    Musculoskeletal:  not assessed    Neurologic:  not assessed    Psychiatric:  not assessed    Heme/Lymph/Imm:  not  "assessed    Endocrine:  not assessed      Physical Exam:  Vitals: /62 (BP Location: Right arm, Patient Position: Right side, Cuff Size: Adult Regular)   Pulse 62   Ht 1.854 m (6' 1\")   Wt 97 kg (213 lb 12.8 oz)   SpO2 98%   BMI 28.21 kg/m      Constitutional:  cooperative, alert and oriented, well developed, well nourished, in no acute distress        Skin:  warm and dry to the touch, no apparent skin lesions or masses noted     bruise noted over left pectoral area-pt said he was scratching there    Head:  normocephalic, no masses or lesions        Eyes:  pupils equal and round, conjunctivae and lids unremarkable, sclera white, no xanthalasma, EOMS intact, no nystagmus        Lymph:No Cervical lymphadenopathy present;No thyromegaly     ENT:  no pallor or cyanosis, dentition good        Neck:  carotid pulses are full and equal bilaterally, JVP normal, no carotid bruit   NO SUBCLAVIAN VEIN SYNDROME-no dilated neck veins    Respiratory:  normal breath sounds, clear to auscultation, normal A-P diameter, normal symmetry, normal respiratory excursion, no use of accessory muscles         Cardiac: regular rhythm, normal S1/S2, no S3 or S4, apical impulse not displaced, no murmurs, gallops or rubs             minimally split s1  pulses full and equal, no bruits auscultated                                        GI:  abdomen soft, non-tender, BS normoactive, no mass, no HSM, no bruits        Extremities and Muscular Skeletal:  no deformities, clubbing, cyanosis, erythema observed;no edema              Neurological:  no gross motor deficits        Psych:  Alert and Oriented x 3      Recent Lab Results:  LIPID RESULTS:  Lab Results   Component Value Date    CHOL 182 06/15/2023    HDL 53 06/15/2023    LDL 69 06/15/2023    TRIG 300 (H) 06/15/2023       LIVER ENZYME RESULTS:  Lab Results   Component Value Date    AST 23 06/15/2023    ALT 28 06/15/2023       CBC RESULTS:  Lab Results   Component Value Date    WBC 7.3 " "02/10/2021    RBC 4.43 02/10/2021    HGB 10.9 (L) 04/07/2021    HCT 39.6 (L) 02/10/2021    MCV 89 02/10/2021    MCH 29.1 02/10/2021    MCHC 32.6 02/10/2021    RDW 13.4 02/10/2021     02/10/2021       BMP RESULTS:  Lab Results   Component Value Date     06/15/2023    POTASSIUM 4.3 06/15/2023    POTASSIUM 4.1 06/09/2022    CHLORIDE 104 06/15/2023    CHLORIDE 105 06/09/2022    CO2 23 06/15/2023    CO2 23 06/09/2022    ANIONGAP 11 06/15/2023    ANIONGAP 11 06/09/2022    GLC 73 06/15/2023    GLC 89 06/09/2022    BUN 13.4 06/15/2023    BUN 13 06/09/2022    CR 1.24 (H) 06/15/2023    GFRESTIMATED 61 06/15/2023    GFRESTIMATED >60 06/30/2020    GFRESTBLACK >60 06/30/2020    SCOTTY 8.6 (L) 06/15/2023        A1C RESULTS:  No results found for: \"A1C\"    INR RESULTS:  Lab Results   Component Value Date    INR 1.13 (H) 08/24/2019    INR 0.94 08/01/2019               CC  Dyllan Wiley MD  [address] [place] [address]      Thank you for allowing me to participate in the care of your patient.      Sincerely,     Alfred Ruiz MD     [facility]  "

## 2023-10-17 ENCOUNTER — HOSPITAL ENCOUNTER (OUTPATIENT)
Dept: CARDIOLOGY | Facility: CLINIC | Age: 73
Discharge: HOME | End: 2023-10-17
Attending: INTERNAL MEDICINE
Payer: COMMERCIAL

## 2023-10-17 ENCOUNTER — HOSPITAL ENCOUNTER (OUTPATIENT)
Dept: RESPIRATORY THERAPY | Facility: CLINIC | Age: 73
Discharge: HOME | End: 2023-10-17
Attending: SURGERY
Payer: COMMERCIAL

## 2023-10-17 ENCOUNTER — LAB (OUTPATIENT)
Dept: LAB | Facility: CLINIC | Age: 73
End: 2023-10-17
Attending: SURGERY
Payer: COMMERCIAL

## 2023-10-17 DIAGNOSIS — R94.31 ABNORMAL ELECTROCARDIOGRAM: ICD-10-CM

## 2023-10-17 DIAGNOSIS — R22.2 CHEST MASS: ICD-10-CM

## 2023-10-17 DIAGNOSIS — Z01.818 PRE-OP TESTING: ICD-10-CM

## 2023-10-17 LAB
ALLEN'S TEST: YES
BASE EXCESS BLDA CALC-SCNC: -0.2 MMOL/L (ref -9–1.8)
HCO3 BLD-SCNC: 24 MMOL/L (ref 21–28)
O2/TOTAL GAS SETTING VFR VENT: 21 %
PCO2 BLD: 37 MM HG (ref 35–45)
PH BLD: 7.42 [PH] (ref 7.35–7.45)
PO2 BLD: 91 MM HG (ref 80–105)

## 2023-10-17 PROCEDURE — 93017 CV STRESS TEST TRACING ONLY: CPT

## 2023-10-17 PROCEDURE — 255N000002 HC RX 255 OP 636: Performed by: INTERNAL MEDICINE

## 2023-10-17 PROCEDURE — 36600 WITHDRAWAL OF ARTERIAL BLOOD: CPT

## 2023-10-17 PROCEDURE — 93016 CV STRESS TEST SUPVJ ONLY: CPT | Performed by: INTERNAL MEDICINE

## 2023-10-17 PROCEDURE — 999N000157 HC STATISTIC RCP TIME EA 10 MIN

## 2023-10-17 PROCEDURE — 93325 DOPPLER ECHO COLOR FLOW MAPG: CPT | Mod: 26 | Performed by: INTERNAL MEDICINE

## 2023-10-17 PROCEDURE — 93321 DOPPLER ECHO F-UP/LMTD STD: CPT | Mod: 26 | Performed by: INTERNAL MEDICINE

## 2023-10-17 PROCEDURE — 93018 CV STRESS TEST I&R ONLY: CPT | Performed by: INTERNAL MEDICINE

## 2023-10-17 PROCEDURE — 93350 STRESS TTE ONLY: CPT | Mod: 26 | Performed by: INTERNAL MEDICINE

## 2023-10-17 RX ADMIN — HUMAN ALBUMIN MICROSPHERES AND PERFLUTREN 3 ML: 10; .22 INJECTION, SOLUTION INTRAVENOUS at 11:27

## 2023-10-17 NOTE — PROGRESS NOTES
An ABG was completed on the pt's left radial @  1029 on Room air. Pressure was held until bleeding stopped. No complications noted during the procedure.     Radha Boland RT, RT  10/17/2023 10:36 AM

## 2023-10-18 ENCOUNTER — TELEPHONE (OUTPATIENT)
Dept: CARDIOLOGY | Facility: CLINIC | Age: 73
End: 2023-10-18
Payer: COMMERCIAL

## 2023-10-18 NOTE — TELEPHONE ENCOUNTER
Called Pt informed him of normal stress test:  Procedure  Stress Echo Complete. Contrast [employer].  ______________________________________________________________________________  Interpretation Summary  A low workload was achieved.  Target Heart Rate was achieved.  There was no chest pain or significant ST changes with exercise.  This was a normal stress echocardiogram with no evidence of stress-induced  ischemia.  Normal resting wall motion and no stress-induced wall motion abnormality.      Per DR Ruiz Office visit note : I will schedule an office visit with a nurse practitioner however if the test is normal he can probably call and have my nurse review that with him and he can cancel the visit we will keep the slot open for nurse practitioner visit in case there is an abnormality found on the above tests..     Office visit canceled.     Will ask cardiologist to make short note in this telephone encounter stating Pt clear for surgery.  BECKIE Villagomez RN

## 2023-11-21 ENCOUNTER — MEDICAL CORRESPONDENCE (OUTPATIENT)
Dept: HEALTH INFORMATION MANAGEMENT | Facility: CLINIC | Age: 73
End: 2023-11-21
Payer: COMMERCIAL

## 2023-11-27 ENCOUNTER — MYC MEDICAL ADVICE (OUTPATIENT)
Dept: INTERVENTIONAL RADIOLOGY/VASCULAR | Facility: CLINIC | Age: 73
End: 2023-11-27
Payer: COMMERCIAL

## 2023-11-27 NOTE — TELEPHONE ENCOUNTER
Detailed Voicemail message left with request for patient to contact Interventional Radiology Department and acknowledge understanding of MYCHART instructions, including need for pre-operative physical and contacting primary physician for ASA management, that were sent in anticipation of procedure scheduled 12/4/2023.      IR contact number provided in [facility].    Danitza WOLFE  Interventional Radiology RN   [phone]

## 2023-11-27 NOTE — TELEPHONE ENCOUNTER
Writer has spoken with Live regarding planned procedure with IR via telephone.      Live acknowledges understanding of pre-procedure instructions.         I have provided Live with IR number ([phone]) for questions or concerns.    Live has a scheduled pre-operative appointment on 11/30/2023 and has IR fax number now.  He will request documentation to be faxed appropriately.  Live states he has a plan for holding his ASA prior to procedure.    Danitza WOLFE  Interventional Radiology RN   [phone]

## 2023-12-01 ENCOUNTER — LAB REQUISITION (OUTPATIENT)
Dept: LAB | Facility: CLINIC | Age: 73
End: 2023-12-01

## 2023-12-01 DIAGNOSIS — Z01.818 ENCOUNTER FOR OTHER PREPROCEDURAL EXAMINATION: ICD-10-CM

## 2023-12-01 PROCEDURE — 80048 BASIC METABOLIC PNL TOTAL CA: CPT | Performed by: NURSE PRACTITIONER

## 2023-12-02 LAB
ANION GAP SERPL CALCULATED.3IONS-SCNC: 12 MMOL/L (ref 7–15)
BUN SERPL-MCNC: 9.7 MG/DL (ref 8–23)
CALCIUM SERPL-MCNC: 8.7 MG/DL (ref 8.8–10.2)
CHLORIDE SERPL-SCNC: 105 MMOL/L (ref 98–107)
CREAT SERPL-MCNC: 1.17 MG/DL (ref 0.67–1.17)
DEPRECATED HCO3 PLAS-SCNC: 21 MMOL/L (ref 22–29)
EGFRCR SERPLBLD CKD-EPI 2021: 66 ML/MIN/1.73M2
GLUCOSE SERPL-MCNC: 108 MG/DL (ref 70–99)
POTASSIUM SERPL-SCNC: 4.2 MMOL/L (ref 3.4–5.3)
SODIUM SERPL-SCNC: 138 MMOL/L (ref 135–145)

## 2023-12-04 ENCOUNTER — HOSPITAL ENCOUNTER (OUTPATIENT)
Dept: CT IMAGING | Facility: HOSPITAL | Age: 73
Discharge: HOME | End: 2023-12-04
Attending: SURGERY | Admitting: RADIOLOGY
Payer: COMMERCIAL

## 2023-12-04 VITALS
OXYGEN SATURATION: 99 % | SYSTOLIC BLOOD PRESSURE: 130 MMHG | DIASTOLIC BLOOD PRESSURE: 65 MMHG | HEART RATE: 68 BPM | TEMPERATURE: 97.9 F | RESPIRATION RATE: 18 BRPM

## 2023-12-04 DIAGNOSIS — J98.59 MEDIASTINAL MASS: Primary | ICD-10-CM

## 2023-12-04 LAB
INR PPP: 0.92 (ref 0.85–1.15)
PLATELET # BLD AUTO: 209 10E3/UL (ref 150–450)

## 2023-12-04 PROCEDURE — 85049 AUTOMATED PLATELET COUNT: CPT | Performed by: RADIOLOGY

## 2023-12-04 PROCEDURE — 88305 TISSUE EXAM BY PATHOLOGIST: CPT | Mod: TC | Performed by: SURGERY

## 2023-12-04 PROCEDURE — 85610 PROTHROMBIN TIME: CPT | Performed by: RADIOLOGY

## 2023-12-04 PROCEDURE — 36415 COLL VENOUS BLD VENIPUNCTURE: CPT | Performed by: RADIOLOGY

## 2023-12-04 PROCEDURE — 250N000013 HC RX MED GY IP 250 OP 250 PS 637: Performed by: RADIOLOGY

## 2023-12-04 PROCEDURE — 88333 PATH CONSLTJ SURG CYTO XM 1: CPT | Mod: TC | Performed by: SURGERY

## 2023-12-04 PROCEDURE — 250N000011 HC RX IP 250 OP 636: Performed by: RADIOLOGY

## 2023-12-04 PROCEDURE — 32408 CORE NDL BX LNG/MED PERQ: CPT

## 2023-12-04 PROCEDURE — 272N000717 CT LUNG MEDIASTINUM BIOPSY

## 2023-12-04 PROCEDURE — 99152 MOD SED SAME PHYS/QHP 5/>YRS: CPT

## 2023-12-04 RX ORDER — NALOXONE HYDROCHLORIDE 0.4 MG/ML
0.2 INJECTION, SOLUTION INTRAMUSCULAR; INTRAVENOUS; SUBCUTANEOUS
Status: DISCONTINUED | OUTPATIENT
Start: 2023-12-04 | End: 2023-12-05 | Stop reason: HOSPADM

## 2023-12-04 RX ORDER — FENTANYL CITRATE 50 UG/ML
25-50 INJECTION, SOLUTION INTRAMUSCULAR; INTRAVENOUS EVERY 5 MIN PRN
Status: DISCONTINUED | OUTPATIENT
Start: 2023-12-04 | End: 2023-12-05 | Stop reason: HOSPADM

## 2023-12-04 RX ORDER — NALOXONE HYDROCHLORIDE 0.4 MG/ML
0.4 INJECTION, SOLUTION INTRAMUSCULAR; INTRAVENOUS; SUBCUTANEOUS
Status: DISCONTINUED | OUTPATIENT
Start: 2023-12-04 | End: 2023-12-05 | Stop reason: HOSPADM

## 2023-12-04 RX ORDER — FUROSEMIDE 20 MG
20 TABLET ORAL EVERY OTHER DAY
COMMUNITY

## 2023-12-04 RX ORDER — ACETAMINOPHEN 325 MG/1
650 TABLET ORAL EVERY 4 HOURS PRN
Status: CANCELLED | OUTPATIENT
Start: 2023-12-04

## 2023-12-04 RX ORDER — FLUMAZENIL 0.1 MG/ML
0.2 INJECTION, SOLUTION INTRAVENOUS
Status: DISCONTINUED | OUTPATIENT
Start: 2023-12-04 | End: 2023-12-05 | Stop reason: HOSPADM

## 2023-12-04 RX ORDER — ACETAMINOPHEN 325 MG/1
975 TABLET ORAL ONCE
Status: COMPLETED | OUTPATIENT
Start: 2023-12-04 | End: 2023-12-04

## 2023-12-04 RX ORDER — FEXOFENADINE HCL 180 MG/1
180 TABLET ORAL DAILY
COMMUNITY

## 2023-12-04 RX ADMIN — FENTANYL CITRATE 50 MCG: 50 INJECTION, SOLUTION INTRAMUSCULAR; INTRAVENOUS at 09:15

## 2023-12-04 RX ADMIN — FENTANYL CITRATE 50 MCG: 50 INJECTION, SOLUTION INTRAMUSCULAR; INTRAVENOUS at 08:59

## 2023-12-04 RX ADMIN — MIDAZOLAM HYDROCHLORIDE 1 MG: 1 INJECTION, SOLUTION INTRAMUSCULAR; INTRAVENOUS at 08:50

## 2023-12-04 RX ADMIN — MIDAZOLAM HYDROCHLORIDE 1 MG: 1 INJECTION, SOLUTION INTRAMUSCULAR; INTRAVENOUS at 08:42

## 2023-12-04 RX ADMIN — MIDAZOLAM HYDROCHLORIDE 1 MG: 1 INJECTION, SOLUTION INTRAMUSCULAR; INTRAVENOUS at 08:57

## 2023-12-04 RX ADMIN — FENTANYL CITRATE 50 MCG: 50 INJECTION, SOLUTION INTRAMUSCULAR; INTRAVENOUS at 08:44

## 2023-12-04 RX ADMIN — ACETAMINOPHEN 975 MG: 325 TABLET ORAL at 09:33

## 2023-12-04 RX ADMIN — FENTANYL CITRATE 50 MCG: 50 INJECTION, SOLUTION INTRAMUSCULAR; INTRAVENOUS at 08:52

## 2023-12-04 NOTE — PRE-PROCEDURE
GENERAL PRE-PROCEDURE:   Procedure:  CT guided left upper chest mass biopsy with moderate sedation  Date/Time:  12/4/2023 8:25 AM    Written consent obtained?: Yes    Risks and benefits: Risks, benefits and alternatives were discussed    Consent given by:  Patient  Patient states understanding of procedure being performed: Yes    Patient's understanding of procedure matches consent: Yes    Procedure consent matches procedure scheduled: Yes    Expected level of sedation:  Moderate  Appropriately NPO:  Yes  ASA Class:  2  Mallampati  :  Grade 2- soft palate, base of uvula, tonsillar pillars, and portion of posterior pharyngeal wall visible  Lungs:  Lungs clear with good breath sounds bilaterally  Heart:  Normal heart sounds and rate  History & Physical reviewed:  History and physical reviewed and no updates needed  Statement of review:  I have reviewed the lab findings, diagnostic data, medications, and the plan for sedation

## 2023-12-04 NOTE — IR NOTE
Patient Name: Leena Glover  Medical Record Number: [id number]  Today's Date: 12/4/2023    Procedure: biopsy  Proceduralist: dickson    Procedure Start: 0845  Procedure end: 0915  Sedation medications administered: 3mg midazolam and  200 mcg fentanyl   Sedation time: 30 minutes

## 2023-12-06 LAB
PATH REPORT.COMMENTS IMP SPEC: NORMAL
PATH REPORT.FINAL DX SPEC: NORMAL
PATH REPORT.GROSS SPEC: NORMAL
PATH REPORT.MICROSCOPIC SPEC OTHER STN: NORMAL
PATH REPORT.RELEVANT HX SPEC: NORMAL
PHOTO IMAGE: NORMAL

## 2023-12-06 PROCEDURE — 88342 IMHCHEM/IMCYTCHM 1ST ANTB: CPT | Mod: 26 | Performed by: PATHOLOGY

## 2023-12-06 PROCEDURE — 88305 TISSUE EXAM BY PATHOLOGIST: CPT | Mod: 26 | Performed by: PATHOLOGY

## 2023-12-06 PROCEDURE — 88341 IMHCHEM/IMCYTCHM EA ADD ANTB: CPT | Mod: 26 | Performed by: PATHOLOGY

## 2023-12-06 PROCEDURE — 88333 PATH CONSLTJ SURG CYTO XM 1: CPT | Mod: 26 | Performed by: PATHOLOGY

## 2023-12-06 PROCEDURE — 88360 TUMOR IMMUNOHISTOCHEM/MANUAL: CPT | Mod: 26 | Performed by: PATHOLOGY

## 2023-12-28 ENCOUNTER — TRANSFERRED RECORDS (OUTPATIENT)
Dept: HEALTH INFORMATION MANAGEMENT | Facility: CLINIC | Age: 73
End: 2023-12-28
Payer: COMMERCIAL

## 2024-01-18 ENCOUNTER — HOSPITAL ENCOUNTER (OUTPATIENT)
Dept: RADIOLOGY | Facility: CLINIC | Age: 74
Discharge: HOME | End: 2024-01-18
Attending: SURGERY | Admitting: SURGERY
Payer: COMMERCIAL

## 2024-01-18 DIAGNOSIS — R05.9 COUGH: ICD-10-CM

## 2024-01-18 DIAGNOSIS — K21.9 GASTRO-ESOPHAGEAL REFLUX: ICD-10-CM

## 2024-01-18 PROCEDURE — 74220 X-RAY XM ESOPHAGUS 1CNTRST: CPT

## 2024-01-18 PROCEDURE — 250N000013 HC RX MED GY IP 250 OP 250 PS 637: Performed by: SURGERY

## 2024-01-18 RX ADMIN — ANTACID/ANTIFLATULENT 4 G: 380; 550; 10; 10 GRANULE, EFFERVESCENT ORAL at 07:54

## 2024-01-29 ENCOUNTER — HOSPITAL ENCOUNTER (OUTPATIENT)
Dept: NUCLEAR MEDICINE | Facility: CLINIC | Age: 74
Discharge: HOME | End: 2024-01-29
Attending: SURGERY | Admitting: SURGERY
Payer: COMMERCIAL

## 2024-01-29 DIAGNOSIS — R05.9 COUGH: ICD-10-CM

## 2024-01-29 DIAGNOSIS — K21.9 GASTRO-ESOPHAGEAL REFLUX: ICD-10-CM

## 2024-01-29 PROCEDURE — 78264 GASTRIC EMPTYING IMG STUDY: CPT

## 2024-01-29 PROCEDURE — A9541 TC99M SULFUR COLLOID: HCPCS | Performed by: SURGERY

## 2024-01-29 PROCEDURE — 343N000001 HC RX 343: Performed by: SURGERY

## 2024-01-29 RX ADMIN — Medication 1.03 MILLICURIE: at 12:12

## 2024-03-14 ENCOUNTER — LAB REQUISITION (OUTPATIENT)
Dept: LAB | Facility: CLINIC | Age: 74
End: 2024-03-14

## 2024-03-14 DIAGNOSIS — Z01.818 ENCOUNTER FOR OTHER PREPROCEDURAL EXAMINATION: ICD-10-CM

## 2024-03-14 PROCEDURE — 80048 BASIC METABOLIC PNL TOTAL CA: CPT | Performed by: STUDENT IN AN ORGANIZED HEALTH CARE EDUCATION/TRAINING PROGRAM

## 2024-03-16 LAB
ANION GAP SERPL CALCULATED.3IONS-SCNC: 12 MMOL/L (ref 7–15)
BUN SERPL-MCNC: 16.2 MG/DL (ref 8–23)
CALCIUM SERPL-MCNC: 9.9 MG/DL (ref 8.8–10.2)
CHLORIDE SERPL-SCNC: 103 MMOL/L (ref 98–107)
CREAT SERPL-MCNC: 1.24 MG/DL (ref 0.67–1.17)
DEPRECATED HCO3 PLAS-SCNC: 23 MMOL/L (ref 22–29)
EGFRCR SERPLBLD CKD-EPI 2021: 61 ML/MIN/1.73M2
GLUCOSE SERPL-MCNC: 90 MG/DL (ref 70–99)
POTASSIUM SERPL-SCNC: 3.9 MMOL/L (ref 3.4–5.3)
SODIUM SERPL-SCNC: 138 MMOL/L (ref 135–145)

## 2024-03-18 RX ORDER — TRIAMCINOLONE ACETONIDE 1 MG/G
CREAM TOPICAL 2 TIMES DAILY
COMMUNITY

## 2024-03-19 ENCOUNTER — ANESTHESIA EVENT (OUTPATIENT)
Dept: SURGERY | Facility: HOSPITAL | Age: 74
End: 2024-03-19
Payer: COMMERCIAL

## 2024-03-19 ENCOUNTER — ANESTHESIA (OUTPATIENT)
Dept: SURGERY | Facility: HOSPITAL | Age: 74
End: 2024-03-19
Payer: COMMERCIAL

## 2024-03-19 ENCOUNTER — HOSPITAL ENCOUNTER (OUTPATIENT)
Facility: HOSPITAL | Age: 74
Discharge: HOME | End: 2024-03-19
Attending: SURGERY | Admitting: SURGERY
Payer: COMMERCIAL

## 2024-03-19 VITALS
TEMPERATURE: 97.6 F | RESPIRATION RATE: 20 BRPM | SYSTOLIC BLOOD PRESSURE: 129 MMHG | OXYGEN SATURATION: 97 % | WEIGHT: 209 LBS | HEART RATE: 74 BPM | BODY MASS INDEX: 27.57 KG/M2 | DIASTOLIC BLOOD PRESSURE: 57 MMHG

## 2024-03-19 DIAGNOSIS — K21.9 GASTROESOPHAGEAL REFLUX DISEASE WITHOUT ESOPHAGITIS: Primary | ICD-10-CM

## 2024-03-19 PROCEDURE — 250N000011 HC RX IP 250 OP 636: Performed by: STUDENT IN AN ORGANIZED HEALTH CARE EDUCATION/TRAINING PROGRAM

## 2024-03-19 PROCEDURE — 258N000003 HC RX IP 258 OP 636

## 2024-03-19 PROCEDURE — 250N000011 HC RX IP 250 OP 636

## 2024-03-19 PROCEDURE — C1781 MESH (IMPLANTABLE): HCPCS | Performed by: SURGERY

## 2024-03-19 PROCEDURE — 258N000003 HC RX IP 258 OP 636: Performed by: SURGERY

## 2024-03-19 PROCEDURE — 250N000025 HC SEVOFLURANE, PER MIN: Performed by: SURGERY

## 2024-03-19 PROCEDURE — 250N000009 HC RX 250: Performed by: SURGERY

## 2024-03-19 PROCEDURE — 272N000001 HC OR GENERAL SUPPLY STERILE: Performed by: SURGERY

## 2024-03-19 PROCEDURE — 370N000017 HC ANESTHESIA TECHNICAL FEE, PER MIN: Performed by: SURGERY

## 2024-03-19 PROCEDURE — 258N000003 HC RX IP 258 OP 636: Performed by: STUDENT IN AN ORGANIZED HEALTH CARE EDUCATION/TRAINING PROGRAM

## 2024-03-19 PROCEDURE — 250N000011 HC RX IP 250 OP 636: Performed by: SURGERY

## 2024-03-19 PROCEDURE — 710N000009 HC RECOVERY PHASE 1, LEVEL 1, PER MIN: Performed by: SURGERY

## 2024-03-19 PROCEDURE — 250N000009 HC RX 250: Performed by: STUDENT IN AN ORGANIZED HEALTH CARE EDUCATION/TRAINING PROGRAM

## 2024-03-19 PROCEDURE — 710N000012 HC RECOVERY PHASE 2, PER MINUTE: Performed by: SURGERY

## 2024-03-19 PROCEDURE — 250N000009 HC RX 250

## 2024-03-19 PROCEDURE — 999N000141 HC STATISTIC PRE-PROCEDURE NURSING ASSESSMENT: Performed by: SURGERY

## 2024-03-19 PROCEDURE — 272N000004 HC RX 272: Performed by: SURGERY

## 2024-03-19 PROCEDURE — 360N000077 HC SURGERY LEVEL 4, PER MIN: Performed by: SURGERY

## 2024-03-19 PROCEDURE — 250N000013 HC RX MED GY IP 250 OP 250 PS 637: Performed by: STUDENT IN AN ORGANIZED HEALTH CARE EDUCATION/TRAINING PROGRAM

## 2024-03-19 DEVICE — BARD® PTFE FELT, 5.1 CM X 5.1 CM
Type: IMPLANTABLE DEVICE | Site: ABDOMEN | Status: FUNCTIONAL
Brand: BARD® PTFE FELT

## 2024-03-19 RX ORDER — ONDANSETRON 4 MG/1
4 TABLET, ORALLY DISINTEGRATING ORAL EVERY 30 MIN PRN
Status: DISCONTINUED | OUTPATIENT
Start: 2024-03-19 | End: 2024-03-19 | Stop reason: HOSPADM

## 2024-03-19 RX ORDER — ONDANSETRON 2 MG/ML
4 INJECTION INTRAMUSCULAR; INTRAVENOUS EVERY 30 MIN PRN
Status: DISCONTINUED | OUTPATIENT
Start: 2024-03-19 | End: 2024-03-19 | Stop reason: HOSPADM

## 2024-03-19 RX ORDER — MAGNESIUM SULFATE 4 G/50ML
4 INJECTION INTRAVENOUS ONCE
Status: COMPLETED | OUTPATIENT
Start: 2024-03-19 | End: 2024-03-19

## 2024-03-19 RX ORDER — OXYCODONE HYDROCHLORIDE 5 MG/1
10 TABLET ORAL
Status: DISCONTINUED | OUTPATIENT
Start: 2024-03-19 | End: 2024-03-19 | Stop reason: HOSPADM

## 2024-03-19 RX ORDER — PROPOFOL 10 MG/ML
INJECTION, EMULSION INTRAVENOUS PRN
Status: DISCONTINUED | OUTPATIENT
Start: 2024-03-19 | End: 2024-03-19

## 2024-03-19 RX ORDER — SODIUM CHLORIDE, SODIUM LACTATE, POTASSIUM CHLORIDE, CALCIUM CHLORIDE 600; 310; 30; 20 MG/100ML; MG/100ML; MG/100ML; MG/100ML
INJECTION, SOLUTION INTRAVENOUS CONTINUOUS
Status: DISCONTINUED | OUTPATIENT
Start: 2024-03-19 | End: 2024-03-19 | Stop reason: HOSPADM

## 2024-03-19 RX ORDER — FENTANYL CITRATE 50 UG/ML
INJECTION, SOLUTION INTRAMUSCULAR; INTRAVENOUS PRN
Status: DISCONTINUED | OUTPATIENT
Start: 2024-03-19 | End: 2024-03-19

## 2024-03-19 RX ORDER — SODIUM CHLORIDE, SODIUM LACTATE, POTASSIUM CHLORIDE, AND CALCIUM CHLORIDE .6; .31; .03; .02 G/100ML; G/100ML; G/100ML; G/100ML
IRRIGANT IRRIGATION PRN
Status: DISCONTINUED | OUTPATIENT
Start: 2024-03-19 | End: 2024-03-19 | Stop reason: HOSPADM

## 2024-03-19 RX ORDER — EPHEDRINE SULFATE 50 MG/ML
INJECTION, SOLUTION INTRAMUSCULAR; INTRAVENOUS; SUBCUTANEOUS PRN
Status: DISCONTINUED | OUTPATIENT
Start: 2024-03-19 | End: 2024-03-19

## 2024-03-19 RX ORDER — HYDROMORPHONE HYDROCHLORIDE 1 MG/ML
0.2 INJECTION, SOLUTION INTRAMUSCULAR; INTRAVENOUS; SUBCUTANEOUS EVERY 5 MIN PRN
Status: DISCONTINUED | OUTPATIENT
Start: 2024-03-19 | End: 2024-03-19 | Stop reason: HOSPADM

## 2024-03-19 RX ORDER — PROPOFOL 10 MG/ML
INJECTION, EMULSION INTRAVENOUS CONTINUOUS PRN
Status: DISCONTINUED | OUTPATIENT
Start: 2024-03-19 | End: 2024-03-19

## 2024-03-19 RX ORDER — FENTANYL CITRATE 50 UG/ML
50 INJECTION, SOLUTION INTRAMUSCULAR; INTRAVENOUS EVERY 5 MIN PRN
Status: DISCONTINUED | OUTPATIENT
Start: 2024-03-19 | End: 2024-03-19 | Stop reason: HOSPADM

## 2024-03-19 RX ORDER — HALOPERIDOL 5 MG/ML
1 INJECTION INTRAMUSCULAR
Status: DISCONTINUED | OUTPATIENT
Start: 2024-03-19 | End: 2024-03-19 | Stop reason: HOSPADM

## 2024-03-19 RX ORDER — GLYCOPYRROLATE 0.2 MG/ML
INJECTION, SOLUTION INTRAMUSCULAR; INTRAVENOUS PRN
Status: DISCONTINUED | OUTPATIENT
Start: 2024-03-19 | End: 2024-03-19

## 2024-03-19 RX ORDER — CEFAZOLIN SODIUM/WATER 2 G/20 ML
2 SYRINGE (ML) INTRAVENOUS SEE ADMIN INSTRUCTIONS
Status: DISCONTINUED | OUTPATIENT
Start: 2024-03-19 | End: 2024-03-19 | Stop reason: HOSPADM

## 2024-03-19 RX ORDER — CEFAZOLIN SODIUM/WATER 2 G/20 ML
2 SYRINGE (ML) INTRAVENOUS
Status: COMPLETED | OUTPATIENT
Start: 2024-03-19 | End: 2024-03-19

## 2024-03-19 RX ORDER — NALOXONE HYDROCHLORIDE 0.4 MG/ML
0.1 INJECTION, SOLUTION INTRAMUSCULAR; INTRAVENOUS; SUBCUTANEOUS
Status: DISCONTINUED | OUTPATIENT
Start: 2024-03-19 | End: 2024-03-19 | Stop reason: HOSPADM

## 2024-03-19 RX ORDER — HYDROCODONE BITARTRATE AND ACETAMINOPHEN 5; 325 MG/1; MG/1
1-2 TABLET ORAL EVERY 4 HOURS PRN
Qty: 20 TABLET | Refills: 0 | Status: SHIPPED | OUTPATIENT
Start: 2024-03-19

## 2024-03-19 RX ORDER — MAGNESIUM HYDROXIDE 1200 MG/15ML
LIQUID ORAL PRN
Status: DISCONTINUED | OUTPATIENT
Start: 2024-03-19 | End: 2024-03-19 | Stop reason: HOSPADM

## 2024-03-19 RX ORDER — KETOROLAC TROMETHAMINE 30 MG/ML
15 INJECTION, SOLUTION INTRAMUSCULAR; INTRAVENOUS
Status: DISCONTINUED | OUTPATIENT
Start: 2024-03-19 | End: 2024-03-19 | Stop reason: HOSPADM

## 2024-03-19 RX ORDER — ACETAMINOPHEN 325 MG/1
975 TABLET ORAL ONCE
Status: COMPLETED | OUTPATIENT
Start: 2024-03-19 | End: 2024-03-19

## 2024-03-19 RX ORDER — OXYCODONE HYDROCHLORIDE 5 MG/1
5 TABLET ORAL
Status: DISCONTINUED | OUTPATIENT
Start: 2024-03-19 | End: 2024-03-19 | Stop reason: HOSPADM

## 2024-03-19 RX ORDER — BUPIVACAINE HYDROCHLORIDE AND EPINEPHRINE 2.5; 5 MG/ML; UG/ML
INJECTION, SOLUTION INFILTRATION; PERINEURAL PRN
Status: DISCONTINUED | OUTPATIENT
Start: 2024-03-19 | End: 2024-03-19 | Stop reason: HOSPADM

## 2024-03-19 RX ORDER — FENTANYL CITRATE 50 UG/ML
25 INJECTION, SOLUTION INTRAMUSCULAR; INTRAVENOUS EVERY 5 MIN PRN
Status: DISCONTINUED | OUTPATIENT
Start: 2024-03-19 | End: 2024-03-19 | Stop reason: HOSPADM

## 2024-03-19 RX ORDER — LORAZEPAM 2 MG/ML
.5-1 INJECTION INTRAMUSCULAR
Status: DISCONTINUED | OUTPATIENT
Start: 2024-03-19 | End: 2024-03-19 | Stop reason: HOSPADM

## 2024-03-19 RX ORDER — ONDANSETRON 2 MG/ML
INJECTION INTRAMUSCULAR; INTRAVENOUS PRN
Status: DISCONTINUED | OUTPATIENT
Start: 2024-03-19 | End: 2024-03-19

## 2024-03-19 RX ORDER — HYDROMORPHONE HYDROCHLORIDE 1 MG/ML
0.4 INJECTION, SOLUTION INTRAMUSCULAR; INTRAVENOUS; SUBCUTANEOUS EVERY 5 MIN PRN
Status: DISCONTINUED | OUTPATIENT
Start: 2024-03-19 | End: 2024-03-19 | Stop reason: HOSPADM

## 2024-03-19 RX ORDER — VASOPRESSIN 20 U/ML
INJECTION PARENTERAL PRN
Status: DISCONTINUED | OUTPATIENT
Start: 2024-03-19 | End: 2024-03-19

## 2024-03-19 RX ORDER — LIDOCAINE 40 MG/G
CREAM TOPICAL
Status: DISCONTINUED | OUTPATIENT
Start: 2024-03-19 | End: 2024-03-19 | Stop reason: HOSPADM

## 2024-03-19 RX ORDER — MEPERIDINE HYDROCHLORIDE 25 MG/ML
12.5 INJECTION INTRAMUSCULAR; INTRAVENOUS; SUBCUTANEOUS EVERY 5 MIN PRN
Status: DISCONTINUED | OUTPATIENT
Start: 2024-03-19 | End: 2024-03-19 | Stop reason: HOSPADM

## 2024-03-19 RX ORDER — DEXAMETHASONE SODIUM PHOSPHATE 10 MG/ML
INJECTION, SOLUTION INTRAMUSCULAR; INTRAVENOUS PRN
Status: DISCONTINUED | OUTPATIENT
Start: 2024-03-19 | End: 2024-03-19

## 2024-03-19 RX ADMIN — SODIUM CHLORIDE, POTASSIUM CHLORIDE, SODIUM LACTATE AND CALCIUM CHLORIDE: 600; 310; 30; 20 INJECTION, SOLUTION INTRAVENOUS at 10:37

## 2024-03-19 RX ADMIN — VASOPRESSIN 1 UNITS: 20 INJECTION, SOLUTION INTRAMUSCULAR; SUBCUTANEOUS at 10:15

## 2024-03-19 RX ADMIN — DEXAMETHASONE SODIUM PHOSPHATE 10 MG: 10 INJECTION, SOLUTION INTRAMUSCULAR; INTRAVENOUS at 10:05

## 2024-03-19 RX ADMIN — VASOPRESSIN 0.5 UNITS: 20 INJECTION, SOLUTION INTRAMUSCULAR; SUBCUTANEOUS at 11:00

## 2024-03-19 RX ADMIN — FENTANYL CITRATE 25 MCG: 50 INJECTION, SOLUTION INTRAMUSCULAR; INTRAVENOUS at 13:08

## 2024-03-19 RX ADMIN — LIDOCAINE HYDROCHLORIDE 50 MG: 10 INJECTION, SOLUTION INFILTRATION; PERINEURAL at 09:42

## 2024-03-19 RX ADMIN — MAGNESIUM SULFATE HEPTAHYDRATE 4 G: 80 INJECTION, SOLUTION INTRAVENOUS at 08:40

## 2024-03-19 RX ADMIN — FENTANYL CITRATE 25 MCG: 50 INJECTION, SOLUTION INTRAMUSCULAR; INTRAVENOUS at 13:20

## 2024-03-19 RX ADMIN — VASOPRESSIN 1 UNITS: 20 INJECTION, SOLUTION INTRAMUSCULAR; SUBCUTANEOUS at 10:37

## 2024-03-19 RX ADMIN — PHENYLEPHRINE HYDROCHLORIDE 100 MCG: 10 INJECTION INTRAVENOUS at 10:07

## 2024-03-19 RX ADMIN — FENTANYL CITRATE 100 MCG: 50 INJECTION INTRAMUSCULAR; INTRAVENOUS at 09:42

## 2024-03-19 RX ADMIN — ROCURONIUM BROMIDE 20 MG: 50 INJECTION, SOLUTION INTRAVENOUS at 10:56

## 2024-03-19 RX ADMIN — PHENYLEPHRINE HYDROCHLORIDE 100 MCG: 10 INJECTION INTRAVENOUS at 11:31

## 2024-03-19 RX ADMIN — ACETAMINOPHEN 975 MG: 325 TABLET ORAL at 08:28

## 2024-03-19 RX ADMIN — PROPOFOL 100 MCG/KG/MIN: 10 INJECTION, EMULSION INTRAVENOUS at 09:46

## 2024-03-19 RX ADMIN — VASOPRESSIN 0.5 UNITS: 20 INJECTION, SOLUTION INTRAMUSCULAR; SUBCUTANEOUS at 11:11

## 2024-03-19 RX ADMIN — ROCURONIUM BROMIDE 40 MG: 50 INJECTION, SOLUTION INTRAVENOUS at 09:50

## 2024-03-19 RX ADMIN — GLYCOPYRROLATE 0.2 MG: 0.2 INJECTION INTRAMUSCULAR; INTRAVENOUS at 09:55

## 2024-03-19 RX ADMIN — VASOPRESSIN 1 UNITS: 20 INJECTION, SOLUTION INTRAMUSCULAR; SUBCUTANEOUS at 10:27

## 2024-03-19 RX ADMIN — VASOPRESSIN 1 UNITS: 20 INJECTION, SOLUTION INTRAMUSCULAR; SUBCUTANEOUS at 10:23

## 2024-03-19 RX ADMIN — PHENYLEPHRINE HYDROCHLORIDE 200 MCG: 10 INJECTION INTRAVENOUS at 10:09

## 2024-03-19 RX ADMIN — Medication 5 MG: at 10:29

## 2024-03-19 RX ADMIN — SODIUM CHLORIDE, POTASSIUM CHLORIDE, SODIUM LACTATE AND CALCIUM CHLORIDE: 600; 310; 30; 20 INJECTION, SOLUTION INTRAVENOUS at 08:41

## 2024-03-19 RX ADMIN — PHENYLEPHRINE HYDROCHLORIDE 100 MCG: 10 INJECTION INTRAVENOUS at 09:59

## 2024-03-19 RX ADMIN — PHENYLEPHRINE HYDROCHLORIDE 200 MCG: 10 INJECTION INTRAVENOUS at 09:55

## 2024-03-19 RX ADMIN — Medication 10 MG: at 10:07

## 2024-03-19 RX ADMIN — Medication 160 MG: at 09:43

## 2024-03-19 RX ADMIN — ROCURONIUM BROMIDE 20 MG: 50 INJECTION, SOLUTION INTRAVENOUS at 10:00

## 2024-03-19 RX ADMIN — SUGAMMADEX 200 MG: 100 INJECTION, SOLUTION INTRAVENOUS at 11:55

## 2024-03-19 RX ADMIN — PROPOFOL 120 MG: 10 INJECTION, EMULSION INTRAVENOUS at 09:42

## 2024-03-19 RX ADMIN — ONDANSETRON 4 MG: 2 INJECTION INTRAMUSCULAR; INTRAVENOUS at 10:05

## 2024-03-19 RX ADMIN — PHENYLEPHRINE HYDROCHLORIDE 100 MCG: 10 INJECTION INTRAVENOUS at 10:58

## 2024-03-19 RX ADMIN — PHENYLEPHRINE HYDROCHLORIDE 200 MCG: 10 INJECTION INTRAVENOUS at 10:05

## 2024-03-19 RX ADMIN — Medication 5 MG: at 10:20

## 2024-03-19 RX ADMIN — PHENYLEPHRINE HYDROCHLORIDE 100 MCG: 10 INJECTION INTRAVENOUS at 10:35

## 2024-03-19 RX ADMIN — Medication 5 MG: at 10:00

## 2024-03-19 RX ADMIN — ROCURONIUM BROMIDE 20 MG: 50 INJECTION, SOLUTION INTRAVENOUS at 11:21

## 2024-03-19 RX ADMIN — Medication 2 G: at 09:33

## 2024-03-19 RX ADMIN — PHENYLEPHRINE HYDROCHLORIDE 0.5 MCG/KG/MIN: 10 INJECTION INTRAVENOUS at 10:20

## 2024-03-19 RX ADMIN — FENTANYL CITRATE 25 MCG: 50 INJECTION, SOLUTION INTRAMUSCULAR; INTRAVENOUS at 13:14

## 2024-03-19 RX ADMIN — PHENYLEPHRINE HYDROCHLORIDE 100 MCG: 10 INJECTION INTRAVENOUS at 10:27

## 2024-03-19 RX ADMIN — FENTANYL CITRATE 25 MCG: 50 INJECTION, SOLUTION INTRAMUSCULAR; INTRAVENOUS at 12:53

## 2024-03-19 ASSESSMENT — ACTIVITIES OF DAILY LIVING (ADL)
ADLS_ACUITY_SCORE: 29

## 2024-03-19 NOTE — OR NURSING
"Pt. Was asked several times if he was having pain pt. Responded with a verbal \" no\".  Pt. Later stated \" can you give me something for pain I am very uncomfortable.\"  Pt. Rated pain a 9 on 1-10 scale.  I informed the pt. I explained that I had tried to evaluate his comfort level several times, he said he must have been confused.  "

## 2024-03-19 NOTE — ANESTHESIA CARE TRANSFER NOTE
Patient: Leena Glover    Procedure: Procedure(s):  LAPAROSCOPIC NISSEN FUNDOPLICATION       Diagnosis: Heartburn symptom [R12]  Diagnosis Additional Information: No value filed.    Anesthesia Type:   General     Note:    Oropharynx: oropharynx clear of all foreign objects  Level of Consciousness: awake  Oxygen Supplementation: face mask  Level of Supplemental Oxygen (L/min / FiO2): 6  Independent Airway: airway patency satisfactory and stable  Dentition: dentition unchanged  Vital Signs Stable: post-procedure vital signs reviewed and stable  Report to RN Given: handoff report given  Patient transferred to: PACU    Handoff Report: Identifed the Patient, Identified the Reponsible Provider, Reviewed the pertinent medical history, Discussed the surgical course, Reviewed Intra-OP anesthesia mangement and issues during anesthesia, Set expectations for post-procedure period and Allowed opportunity for questions and acknowledgement of understanding      Vitals:  Vitals Value Taken Time   /65 03/19/24 1204   Temp 36.7  C (98  F) 03/19/24 1204   Pulse 85 03/19/24 1206   Resp 29 03/19/24 1206   SpO2 98 % 03/19/24 1206   Vitals shown include unfiled device data.    Electronically Signed By: LYNDA Chauhan CRNA  March 19, 2024  12:07 PM

## 2024-03-19 NOTE — ANESTHESIA PREPROCEDURE EVALUATION
Anesthesia Pre-Procedure Evaluation    Patient: Leena Glover   MRN: [id number] : 1950        Procedure : Procedure(s):  LAPAROSCOPIC NISSEN FUNDOPLICATION          Past Medical History:   Diagnosis Date    Acid reflux     very large hiatal hernia (>50% stomach in chest)    ADD (attention deficit disorder)     ADHD     Alcoholism (H)     sober now    Anxiety     Arthritis     Atopic eczema     BPH (benign prostatic hyperplasia)     Cancer (H)     being evaluated for 4x6cm mass in left  posterior superior mediastinum ?sarcoma,?neural sheath?,?lymphoma/ ?small cell CA    Depression     Esophageal dilatation     Esophageal reflux     Heart murmur     Hiatal hernia     Hyperlipidemia     Infection due to  novel coronavirus     Mild cognitive impairment     Moderate asthma without complication, unspecified whether persistent     PFO (patent foramen ovale)     Schwannoma     Paraspinal left thoracic mass    Sleep apnea     uses cpap    Venous (peripheral) insufficiency       Past Surgical History:   Procedure Laterality Date    APPENDECTOMY      ARTHROPLASTY REVISION HIP Left 2021    Procedure: LEFT TOTAL HIP REVISION;  Surgeon: Joey Aly MD;  Location: [facility] OR;  Service: Orthopedics    CATARACT EXTRACTION      ENDOSCOPY      HC EXPLOR/DRAIN KNEE,INFECTN Right 2019    Procedure: INCISION AND DRAINAGE/DEBRIDMENT OF RIGHT TOTAL KNEE REPLACMENT, EXCHANGE POLY;  Surgeon: Konstantin Mei MD;  Location: [facility] Main OR;  Service: Orthopedics    JOINT REPLACEMENT Right 2012    knee cap replaced    JOINT REPLACEMENT Left 2007    KRISTEN, socket replaced    PICC AND MIDLINE TEAM LINE INSERTION  2019         VASECTOMY      ZZC TOTAL KNEE ARTHROPLASTY Right 2019    Procedure: TO TOTAL KNEE ARTHROPLASTY;  Surgeon: Chris Polanco MD;  Location: [facility] OR;  Service: Orthopedics      Allergies   Allergen Reactions    Prilosec [Omeprazole] Diarrhea       Social History     Tobacco Use    Smoking status: Never    Smokeless tobacco: Never   Substance Use Topics    Alcohol use: No     Comment: Alcoholic Drinks/day: sober for 12 years      Wt Readings from Last 1 Encounters:   03/19/24 94.8 kg (209 lb)        Anesthesia Evaluation            ROS/MED HX  ENT/Pulmonary:  - neg pulmonary ROS   (+) sleep apnea, uses CPAP,                    asthma  Treatment: Inhaler prn,                 Neurologic:  - neg neurologic ROS     Cardiovascular: Comment: Echo 2019    No valvular vegetations noted    Normal valves structure and function    Left ventricle ejection fraction is normal. The estimated left ventricular ejection fraction is 60%.    Normal right ventricular size and systolic function.     - neg cardiovascular ROS     METS/Exercise Tolerance: >4 METS    Hematologic:  - neg hematologic  ROS     Musculoskeletal:  - neg musculoskeletal ROS     GI/Hepatic:  - neg GI/hepatic ROS   (+) GERD, Symptomatic,    hiatal hernia,              Renal/Genitourinary:  - neg Renal ROS     Endo:  - neg endo ROS     Psychiatric/Substance Use:  - neg psychiatric ROS     Infectious Disease:  - neg infectious disease ROS     Malignancy:  - neg malignancy ROS     Other:  - neg other ROS          Physical Exam    Airway  airway exam normal      Mallampati: II   TM distance: > 3 FB   Neck ROM: full   Mouth opening: > 3 cm    Respiratory Devices and Support         Dental       (+) Multiple visibly decayed, broken teeth    B=Bridge, C=Chipped, L=Loose, M=Missing    Cardiovascular   cardiovascular exam normal          Pulmonary   pulmonary exam normal                OUTSIDE LABS:  CBC:   Lab Results   Component Value Date    WBC 7.3 02/10/2021    WBC 6.7 06/30/2020    HGB 10.9 (L) 04/07/2021    HGB 12.9 (L) 02/10/2021    HCT 39.6 (L) 02/10/2021    HCT 32.9 (L) 06/30/2020     12/04/2023     02/10/2021     BMP:   Lab Results   Component Value Date     03/14/2024      "12/01/2023    POTASSIUM 3.9 03/14/2024    POTASSIUM 4.2 12/01/2023    CHLORIDE 103 03/14/2024    CHLORIDE 105 12/01/2023    CO2 23 03/14/2024    CO2 21 (L) 12/01/2023    BUN 16.2 03/14/2024    BUN 9.7 12/01/2023    CR 1.24 (H) 03/14/2024    CR 1.17 12/01/2023    GLC 90 03/14/2024     (H) 12/01/2023     COAGS:   Lab Results   Component Value Date    INR 0.92 12/04/2023     POC: No results found for: \"BGM\", \"HCG\", \"HCGS\"  HEPATIC:   Lab Results   Component Value Date    ALBUMIN 3.9 06/15/2023    PROTTOTAL 5.9 (L) 06/15/2023    ALT 28 06/15/2023    AST 23 06/15/2023    ALKPHOS 78 06/15/2023    BILITOTAL 0.2 06/15/2023     OTHER:   Lab Results   Component Value Date    PH 7.42 10/17/2023    SCOTTY 9.9 03/14/2024    CRP <0.1 06/09/2022    SED 4 02/10/2021       Anesthesia Plan    ASA Status:  3       Anesthesia Type: General.     - Airway: ETT   Induction: RSI.           Consents    Anesthesia Plan(s) and associated risks, benefits, and realistic alternatives discussed. Questions answered and patient/representative(s) expressed understanding.     - Discussed:     - Discussed with:  Patient            Postoperative Care       PONV prophylaxis: Ondansetron (or other 5HT-3), Dexamethasone or Solumedrol     Comments:    Other Comments: Patient history and physical exam reviewed. NPO status appropriate. Focused physical and history performed, pre-op evaluation updated. RBA discussed re: anesthesia and patients questions answered.       RSI    2 puffs of inhaler prior to induction.             Sachin Granger MD    I have reviewed the pertinent notes and labs in the chart from the past 30 days and (re)examined the patient.  Any updates or changes from those notes are reflected in this note.             # Drug Induced Platelet Defect: home medication list includes an antiplatelet medication      "

## 2024-03-19 NOTE — ANESTHESIA PROCEDURE NOTES
Airway       Patient location during procedure: OR       Procedure Start/Stop Times: 3/19/2024 9:45 AM  Staff -        CRNA: Amisha Harden APRN CRNA       Performed By: CRNA  Consent for Airway        Urgency: elective  Indications and Patient Condition       Indications for airway management: chava-procedural       Induction type:intravenous       Mask difficulty assessment: 0 - not attempted    Final Airway Details       Final airway type: endotracheal airway       Successful airway: ETT - single  Endotracheal Airway Details        ETT size (mm): 8.0       Cuffed: yes       Cuff volume (mL): 10       Successful intubation technique: direct laryngoscopy       DL Blade Type: MAC 4       Grade View of Cords: 1       Adjucts: stylet       Position: Right       Secured at (cm): 24       Bite block used: None    Post intubation assessment        Placement verified by: capnometry, equal breath sounds and chest rise        Number of attempts at approach: 1       Number of other approaches attempted: 0       Secured with: tape and commercial tube davis       Ease of procedure: easy       Dentition: Intact and Unchanged    Medication(s) Administered   Medication Administration Time: 3/19/2024 9:45 AM

## 2024-03-19 NOTE — OP NOTE
OPERATIVE REPORT    Leena Glover  [facility]  Medical Record #:  [id number]  YOB: 1950  Age:  73 year old    PROCEDURE DATE:  3/19/2024    PREOPERATIVE DIAGNOSIS: Hiatal hernia with paraesophageal component and marked clinical reflux and marked reflux on esophagram    POSTOPERATIVE DIAGNOSIS: Same    PROCEDURE: Laparoscopic repair of paraesophageal and hiatal hernia with laparoscopic Nissen fundoplication over a 52 Fleming dilator with excision of stomach below the diaphragm    OPERATING SURGEON:  Dyllan Wiley MD    ASSISTANT: Technician    ANESTHESIA: General    DESCRIPTION OF PROCEDURE: With the patient in supine position under general anesthesia having reviewed the risk benefits and complications of surgical invention with him on multiple occasions in light of his chronic gastroesophageal reflux from a symptomatic standpoint and completion of all appropriate diagnostic tests regarding reflux disease the abdomen is prepped in usual sterile fashion.  It should be noted the patient has an esophagram that shows flagrant continuous reflux during his exam.  Symptomatically he has this as well.  He has a paraesophageal component to his hernia and it is of note that he has 24-hour pH study was not consistent with his other diagnostic tests or his clinical status.  In light of this review would been undertaken with the patient and his wife regarding the benefit of probable Nissen fundoplication at the time of his diaphragmatic hernia repair.  Pneumoperitoneum is instilled at the umbilical location and 4 additional trocars are placed in the upper abdomen and the liver retractor is placed and secured.  From a headache hernia is clearly visualized.  In sequential fashion the upper short gastric vessels are divided the hernia sac isolated at the level of the right and the left fadia and dissected from the thoracic cavity.  The portion of his stomach periesophageal in nature was posterior and to  the right and this was dissected and brought into the abdominal cavity as well.  Right and left fadia were clearly identified and isolated.  These are approximated with pledgeted 0 Ethibond sutures.  52 Fleming dilator is then placed by direct visualization by into the stomach and traversed the gastroesophageal junction without difficulty.  Posterior vagus nerve was clearly visualized and preserved and the anterior vagus nerve portions were visualized and preserved.  Several sutures pledgeted were used to close the diaphragmatic hiatus and then the Nissen fundoplication is completed at the visualized gross gastroesophageal junction approximating the stomach wall to gastroesophageal junction was shown the wall to stomach wall.  This was done without great intention.  The dilator was removed and anatomic position looked appropriate.  The stomach is secured below the diaphragm with 0 Ethibond sutures as well.  Time was taken to ensure hemostasis is obtained and all instrumentation is removed inspected for the absence of bleeding and the skin was closed with interrupted 4-0 Vicryl subcuticular suture and Dermabond.  The estimated blood loss was minimal there were no complications and the patient tolerated the procedure well.  Sponge and counts are correct x 2.    EBL:  [unfilled]    SPECIMENS:  * No specimens in log *    Dyllan antoine md  [facility]

## 2024-03-19 NOTE — ANESTHESIA POSTPROCEDURE EVALUATION
Patient: Leena Glover    Procedure: Procedure(s):  LAPAROSCOPIC NISSEN FUNDOPLICATION       Anesthesia Type:  General    Note:  Disposition: Outpatient   Postop Pain Control: Uneventful            Sign Out: Well controlled pain   PONV: No   Neuro/Psych: Uneventful            Sign Out: Acceptable/Baseline neuro status   Airway/Respiratory: Uneventful            Sign Out: Acceptable/Baseline resp. status   CV/Hemodynamics: Uneventful            Sign Out: Acceptable CV status; No obvious hypovolemia; No obvious fluid overload   Other NRE: NONE   DID A NON-ROUTINE EVENT OCCUR? No           Last vitals:  Vitals Value Taken Time   /60 03/19/24 1347   Temp 36.4  C (97.5  F) 03/19/24 1347   Pulse 73 03/19/24 1353   Resp 20 03/19/24 1347   SpO2 95 % 03/19/24 1353   Vitals shown include unfiled device data.    Electronically Signed By: Sachin Granger MD  March 19, 2024  2:07 PM

## 2024-04-10 ENCOUNTER — LAB REQUISITION (OUTPATIENT)
Dept: LAB | Facility: CLINIC | Age: 74
End: 2024-04-10

## 2024-04-10 DIAGNOSIS — R63.4 ABNORMAL WEIGHT LOSS: ICD-10-CM

## 2024-04-10 PROCEDURE — G0103 PSA SCREENING: HCPCS | Performed by: FAMILY MEDICINE

## 2024-04-10 PROCEDURE — 80053 COMPREHEN METABOLIC PANEL: CPT | Performed by: FAMILY MEDICINE

## 2024-04-11 LAB
ALBUMIN SERPL BCG-MCNC: 4.1 G/DL (ref 3.5–5.2)
ALP SERPL-CCNC: 116 U/L (ref 40–150)
ALT SERPL W P-5'-P-CCNC: 12 U/L (ref 0–70)
ANION GAP SERPL CALCULATED.3IONS-SCNC: 14 MMOL/L (ref 7–15)
AST SERPL W P-5'-P-CCNC: 13 U/L (ref 0–45)
BILIRUB SERPL-MCNC: 0.2 MG/DL
BUN SERPL-MCNC: 14.4 MG/DL (ref 8–23)
CALCIUM SERPL-MCNC: 9.3 MG/DL (ref 8.8–10.2)
CHLORIDE SERPL-SCNC: 101 MMOL/L (ref 98–107)
CREAT SERPL-MCNC: 1.17 MG/DL (ref 0.67–1.17)
DEPRECATED HCO3 PLAS-SCNC: 22 MMOL/L (ref 22–29)
EGFRCR SERPLBLD CKD-EPI 2021: 65 ML/MIN/1.73M2
GLUCOSE SERPL-MCNC: 109 MG/DL (ref 70–99)
POTASSIUM SERPL-SCNC: 3.9 MMOL/L (ref 3.4–5.3)
PROT SERPL-MCNC: 6.8 G/DL (ref 6.4–8.3)
PSA SERPL DL<=0.01 NG/ML-MCNC: 1.3 NG/ML (ref 0–6.5)
SODIUM SERPL-SCNC: 137 MMOL/L (ref 135–145)

## 2024-04-16 ENCOUNTER — LAB REQUISITION (OUTPATIENT)
Dept: LAB | Facility: CLINIC | Age: 74
End: 2024-04-16

## 2024-04-16 DIAGNOSIS — L03.90 CELLULITIS, UNSPECIFIED: ICD-10-CM

## 2024-04-16 PROCEDURE — 87186 SC STD MICRODIL/AGAR DIL: CPT | Performed by: FAMILY MEDICINE

## 2024-04-20 LAB — BACTERIA WND CULT: ABNORMAL

## 2024-05-09 ENCOUNTER — LAB REQUISITION (OUTPATIENT)
Dept: LAB | Facility: CLINIC | Age: 74
End: 2024-05-09

## 2024-05-09 DIAGNOSIS — M25.571 PAIN IN RIGHT ANKLE AND JOINTS OF RIGHT FOOT: ICD-10-CM

## 2024-05-09 PROCEDURE — 84550 ASSAY OF BLOOD/URIC ACID: CPT | Performed by: STUDENT IN AN ORGANIZED HEALTH CARE EDUCATION/TRAINING PROGRAM

## 2024-05-11 LAB — URATE SERPL-MCNC: 4.6 MG/DL (ref 3.4–7)

## 2024-06-13 ENCOUNTER — TRANSFERRED RECORDS (OUTPATIENT)
Dept: HEALTH INFORMATION MANAGEMENT | Facility: CLINIC | Age: 74
End: 2024-06-13

## 2024-06-13 LAB — PHQ9 SCORE: 4

## 2024-08-04 ENCOUNTER — HEALTH MAINTENANCE LETTER (OUTPATIENT)
Age: 74
End: 2024-08-04

## 2025-01-23 ENCOUNTER — HOSPITAL ENCOUNTER (OUTPATIENT)
Dept: NUCLEAR MEDICINE | Facility: CLINIC | Age: 75
End: 2025-01-23
Attending: INTERNAL MEDICINE
Payer: COMMERCIAL

## 2025-01-23 DIAGNOSIS — K44.9 DIAPHRAGMATIC HERNIA WITHOUT OBSTRUCTION OR GANGRENE: ICD-10-CM

## 2025-01-23 DIAGNOSIS — K21.00 GASTROESOPHAGEAL REFLUX DISEASE WITH ESOPHAGITIS WITHOUT HEMORRHAGE: ICD-10-CM

## 2025-01-23 PROCEDURE — 343N000001 HC RX 343 MED OP 636: Performed by: INTERNAL MEDICINE

## 2025-01-23 PROCEDURE — 78264 GASTRIC EMPTYING IMG STUDY: CPT

## 2025-01-23 PROCEDURE — A9541 TC99M SULFUR COLLOID: HCPCS | Performed by: INTERNAL MEDICINE

## 2025-01-23 RX ADMIN — Medication 1.04 MILLICURIE: at 09:14

## 2025-03-22 ENCOUNTER — HEALTH MAINTENANCE LETTER (OUTPATIENT)
Age: 75
End: 2025-03-22

## 2025-06-10 ENCOUNTER — TRANSCRIBE ORDERS (OUTPATIENT)
Dept: OTHER | Age: 75
End: 2025-06-10

## 2025-06-10 ENCOUNTER — MEDICAL CORRESPONDENCE (OUTPATIENT)
Dept: HEALTH INFORMATION MANAGEMENT | Facility: CLINIC | Age: 75
End: 2025-06-10
Payer: COMMERCIAL

## 2025-06-10 DIAGNOSIS — T81.9XXA POSTOPERATIVE COMPLICATION: Primary | ICD-10-CM

## 2025-06-24 ENCOUNTER — LAB REQUISITION (OUTPATIENT)
Dept: LAB | Facility: CLINIC | Age: 75
End: 2025-06-24

## 2025-06-24 DIAGNOSIS — E78.2 MIXED HYPERLIPIDEMIA: ICD-10-CM

## 2025-06-24 DIAGNOSIS — F10.21 ALCOHOL DEPENDENCE, IN REMISSION (H): ICD-10-CM

## 2025-06-24 PROCEDURE — 84155 ASSAY OF PROTEIN SERUM: CPT | Performed by: FAMILY MEDICINE

## 2025-06-24 PROCEDURE — 83718 ASSAY OF LIPOPROTEIN: CPT | Performed by: FAMILY MEDICINE

## 2025-06-25 LAB
ALBUMIN SERPL BCG-MCNC: 4.1 G/DL (ref 3.5–5.2)
ALP SERPL-CCNC: 82 U/L (ref 40–150)
ALT SERPL W P-5'-P-CCNC: 20 U/L (ref 0–70)
ANION GAP SERPL CALCULATED.3IONS-SCNC: 10 MMOL/L (ref 7–15)
AST SERPL W P-5'-P-CCNC: 21 U/L (ref 0–45)
BILIRUB SERPL-MCNC: 0.2 MG/DL
BUN SERPL-MCNC: 10.9 MG/DL (ref 8–23)
CALCIUM SERPL-MCNC: 8.7 MG/DL (ref 8.8–10.4)
CHLORIDE SERPL-SCNC: 104 MMOL/L (ref 98–107)
CHOLEST SERPL-MCNC: 196 MG/DL
CREAT SERPL-MCNC: 1.1 MG/DL (ref 0.67–1.17)
EGFRCR SERPLBLD CKD-EPI 2021: 70 ML/MIN/1.73M2
FASTING STATUS PATIENT QL REPORTED: ABNORMAL
FASTING STATUS PATIENT QL REPORTED: ABNORMAL
GLUCOSE SERPL-MCNC: 110 MG/DL (ref 70–99)
HCO3 SERPL-SCNC: 24 MMOL/L (ref 22–29)
HDLC SERPL-MCNC: 49 MG/DL
LDLC SERPL CALC-MCNC: 88 MG/DL
NONHDLC SERPL-MCNC: 147 MG/DL
POTASSIUM SERPL-SCNC: 4 MMOL/L (ref 3.4–5.3)
PROT SERPL-MCNC: 6.5 G/DL (ref 6.4–8.3)
SODIUM SERPL-SCNC: 138 MMOL/L (ref 135–145)
TRIGL SERPL-MCNC: 296 MG/DL

## 2025-08-16 ENCOUNTER — HEALTH MAINTENANCE LETTER (OUTPATIENT)
Age: 75
End: 2025-08-16

## (undated) DEVICE — SU ENDO TIE KNOT PLACEMENT RELOAD  030510

## (undated) DEVICE — Device

## (undated) DEVICE — SU ENDO STITICH SURGIDAC 0 ES-9 48"  173024

## (undated) DEVICE — TUBING SUCTION MEDI-VAC 1/4"X20' N620A

## (undated) DEVICE — BAG PRESSURE INFUSION 1000ML COLORED GA 8808

## (undated) DEVICE — PREP CHLORAPREP 26ML TINTED HI-LITE ORANGE 930815

## (undated) DEVICE — ENDO TROCAR SHIELDED BLADED KII Z-THRD 12X100MM CTB73

## (undated) DEVICE — TUBING SMOKE EVAC PNEUMOCLEAR HIGH FLOW 0620050250

## (undated) DEVICE — ENDO TROCAR SLEEVE KII Z-THREADED 11X100MM CTS12

## (undated) DEVICE — GLOVE BIOGEL PI ULTRATOUCH G SZ 7.5 42175

## (undated) DEVICE — DEVICE ENDO STITCH APPLIER 10MM 173016

## (undated) DEVICE — CONTAINER URINE SPEC 4OZ STRL 1053

## (undated) DEVICE — SU DERMABOND ADVANCED .7ML DNX12

## (undated) DEVICE — ANTIFOG SOLUTION SEE SHARP 150M TROCAR SWABS 30978

## (undated) DEVICE — ESU PENCIL SMOKE EVAC W/ROCKER SWITCH 0703-047-000

## (undated) DEVICE — SOL RINGERS LACTATED 1000ML BAG 2B2324X

## (undated) DEVICE — GOWN IMPERVIOUS BREATHABLE SMART LG 89015

## (undated) DEVICE — ESU CORD MONOPOLAR 10'  E0510

## (undated) DEVICE — DRAIN PENROSE 1X36 LF GR304

## (undated) DEVICE — ENDO RETRACTOR II FAN 10MM  176647

## (undated) DEVICE — ESU GROUND PAD ADULT REM W/15' CORD E7507DB

## (undated) DEVICE — TUBING IRRIG TUR Y TYPE 96" LF 6543-01

## (undated) DEVICE — ENDO TROCAR SHIELDED BLADED KII Z-THRD 11X100MM CTB33

## (undated) DEVICE — ESU LIGASURE MARYLAND LAPAROSCOPIC SLR/DVDR 5MMX37CM LF1937

## (undated) DEVICE — DEVICE TI KNOT TK5 030404

## (undated) DEVICE — ENDO RETRACTOR PADDLE 12MM  173046

## (undated) DEVICE — NDL INSUFFLATION 13GA 120MM C2201

## (undated) DEVICE — SOL NACL 0.9% IRRIG 1000ML BOTTLE 2F7124

## (undated) DEVICE — CUSTOM PACK LAP CHOLE SBA5BLCHEA

## (undated) DEVICE — SUTURE VICRYL+ 4-0 UNDYED PS-2 VCP496H

## (undated) DEVICE — SOL WATER IRRIG 1000ML BOTTLE 2F7114

## (undated) RX ORDER — VASOPRESSIN 20 U/ML
INJECTION PARENTERAL
Status: DISPENSED
Start: 2024-03-19

## (undated) RX ORDER — BUPIVACAINE HYDROCHLORIDE AND EPINEPHRINE 2.5; 5 MG/ML; UG/ML
INJECTION, SOLUTION EPIDURAL; INFILTRATION; INTRACAUDAL; PERINEURAL
Status: DISPENSED
Start: 2024-03-19

## (undated) RX ORDER — LIDOCAINE HYDROCHLORIDE 10 MG/ML
INJECTION, SOLUTION INFILTRATION; PERINEURAL
Status: DISPENSED
Start: 2023-12-04

## (undated) RX ORDER — FENTANYL CITRATE 50 UG/ML
INJECTION, SOLUTION INTRAMUSCULAR; INTRAVENOUS
Status: DISPENSED
Start: 2023-12-04

## (undated) RX ORDER — EPHEDRINE SULFATE 50 MG/ML
INJECTION, SOLUTION INTRAMUSCULAR; INTRAVENOUS; SUBCUTANEOUS
Status: DISPENSED
Start: 2024-03-19

## (undated) RX ORDER — GLYCOPYRROLATE 0.2 MG/ML
INJECTION, SOLUTION INTRAMUSCULAR; INTRAVENOUS
Status: DISPENSED
Start: 2024-03-19

## (undated) RX ORDER — PROPOFOL 10 MG/ML
INJECTION, EMULSION INTRAVENOUS
Status: DISPENSED
Start: 2024-03-19

## (undated) RX ORDER — FENTANYL CITRATE 50 UG/ML
INJECTION, SOLUTION INTRAMUSCULAR; INTRAVENOUS
Status: DISPENSED
Start: 2024-03-19

## (undated) RX ORDER — ACETAMINOPHEN 325 MG/1
TABLET ORAL
Status: DISPENSED
Start: 2023-12-04